# Patient Record
Sex: FEMALE | Race: WHITE
[De-identification: names, ages, dates, MRNs, and addresses within clinical notes are randomized per-mention and may not be internally consistent; named-entity substitution may affect disease eponyms.]

---

## 2018-03-06 ENCOUNTER — HOSPITAL ENCOUNTER (EMERGENCY)
Dept: HOSPITAL 62 - ER | Age: 48
Discharge: HOME | End: 2018-03-06
Payer: SELF-PAY

## 2018-03-06 VITALS — SYSTOLIC BLOOD PRESSURE: 131 MMHG | DIASTOLIC BLOOD PRESSURE: 84 MMHG

## 2018-03-06 DIAGNOSIS — M54.40: Primary | ICD-10-CM

## 2018-03-06 DIAGNOSIS — I10: ICD-10-CM

## 2018-03-06 PROCEDURE — 99283 EMERGENCY DEPT VISIT LOW MDM: CPT

## 2018-03-06 PROCEDURE — 72202 X-RAY EXAM SI JOINTS 3/> VWS: CPT

## 2018-03-06 PROCEDURE — 72110 X-RAY EXAM L-2 SPINE 4/>VWS: CPT

## 2018-03-06 NOTE — RADIOLOGY REPORT (SQ)
EXAM DESCRIPTION:  SACROILIAC JOINTS 3 OR MORE



COMPLETED DATE/TIME:  3/6/2018 1:18 pm



REASON FOR STUDY:  back pain



COMPARISON:  None.



NUMBER OF VIEWS:  Three views.



TECHNIQUE:  AP and oblique views of the sacroiliac joints.



LIMITATIONS:  None.



FINDINGS:  MINERALIZATION: Normal.

BONES: No acute fracture or dislocation. No worrisome bone lesions. No significant osteophytes.

JOINTS: The sacroiliac joints are patent.  No unusual widening, sclerosis, or fusion.

SOFT TISSUES: No soft tissue swelling.  No radio-opaque foreign body.

OTHER: No  other significant finding.



IMPRESSION:  NORMAL STUDY OF THE SACROILIAC JOINTS.



TECHNICAL DOCUMENTATION:  JOB ID:  [id number]

 2011 [employer]- All Rights Reserved



Reading location - IP/workstation name: [email or web address]

## 2018-03-06 NOTE — ER DOCUMENT REPORT
ED Neck/Back Problem





- General


Chief Complaint: Back Pain


Stated Complaint: BACK PAIN


Time Seen by Provider: 03/06/18 12:45


Mode of Arrival: Ambulatory


Information source: Patient


Notes: 





Patient is a 48-year-old female who presents to the ER today for low back pain 

radiating down the backs of both legs down to the toes per patient.  She denies 

any numbness or tingling, loss of bladder or bowel function.  Patient has a 

history of chronic back pain.  She denies any injury.


TRAVEL OUTSIDE OF THE [place] IN LAST 30 DAYS: No





- Related Data


Allergies/Adverse Reactions: 


 





No Known Allergies Allergy (Verified 03/06/18 12:01)


 











Past Medical History





- General


Information source: Patient





- Social History


Smoking Status: Unknown if Ever Smoked


Family History: Reviewed & Not Pertinent





- Past Medical History


Cardiac Medical History: Reports: Hx Hypertension


Pulmonary Medical History: Reports: Hx Bronchitis, Hx Pneumonia - 1998


Neurological Medical History: Reports: Hx Migraine


Renal/ Medical History: Denies: Hx Peritoneal Dialysis


Past Surgical History: Reports: Hx Tubal Ligation





- Immunizations


Hx Diphtheria, Pertussis, Tetanus Vaccination: No





Review of Systems





- Review of Systems


Constitutional: No symptoms reported


EENT: No symptoms reported


Cardiovascular: No symptoms reported


Respiratory: No symptoms reported


Gastrointestinal: No symptoms reported


Genitourinary: No symptoms reported


Female Genitourinary: No symptoms reported


Musculoskeletal: See HPI


Skin: No symptoms reported


Hematologic/Lymphatic: No symptoms reported


Neurological/Psychological: No symptoms reported





Physical Exam





- Vital signs


Vitals: 


 











Temp Pulse Resp BP Pulse Ox


 


 98.8 F   98   19   121/70   98 


 


 03/06/18 12:06  03/06/18 12:06  03/06/18 12:06  03/06/18 12:06  03/06/18 12:06














- Notes


Notes: 





PHYSICAL EXAMINATION: 


GENERAL: Obviously uncomfortable, but in no acute distress. 


HEAD: Atraumatic, normocephalic. 


EYES: Pupils equal round and reactive to light, extraocular movements intact, 

sclera anicteric, conjunctiva are normal. 


NECK: Normal range of motion, supple without lymphadenopathy 


LUNGS: CTAB and equal. No wheezes rales or rhonchi. 


HEART: Regular rate and rhythm without murmurs


ABDOMEN: Soft, no tenderness. No guarding, no rebound 


BACK: Lumbar vertebral tenderness, limited ROM secondary to pain, tender to 

bilateral SI joints


GI/: no CVA tenderness


EXTREMITIES: Normal range of motion, no pitting edema. No cyanosis.  


NEUROLOGICAL: Cranial nerves grossly intact. Normal sensory/motor exams.  Good 

and equal strength bilaterally, Kernig and Brudzinski's signs negative, Romberg'

s test normal, normal heel to shin testing 


PSYCH: Normal mood, normal affect. 


SKIN: Warm, Dry, normal turgor, no rashes or lesions noted

















Course





- Re-evaluation


Re-evalutation: 





03/06/18 16:49


Lumbar x-ray, SI joint x-rays negative for any acute pathology.  Patient feels 

better after muscle relaxer.





- Vital Signs


Vital signs: 


 











Temp Pulse Resp BP Pulse Ox


 


 98.2 F   84   18   131/84 H  95 


 


 03/06/18 14:58  03/06/18 14:58  03/06/18 14:58  03/06/18 14:58  03/06/18 14:58














Discharge





- Discharge


Clinical Impression: 


 Back pain with sciatica





Condition: Stable


Disposition: HOME, SELF-CARE


Additional Instructions: 


Return immediately for any new or worsening symptoms.





Follow up with primary care provider, call tomorrow to make followup 

appointment.


Prescriptions: 


Hydrocodone/Acetaminophen [Norco 5-325 mg Tablet] 1 tab PO Q4 #12 tablet


Methocarbamol [Robaxin 500 mg Tablet] 1,000 mg PO BID PRN #40 tablet


 PRN Reason: 


Prednisone 60 mg PO DAILY #15 tablet


Forms:  Return to Work

## 2019-01-06 ENCOUNTER — HOSPITAL ENCOUNTER (EMERGENCY)
Dept: HOSPITAL 62 - ER | Age: 49
LOS: 1 days | Discharge: HOME | End: 2019-01-07
Payer: SELF-PAY

## 2019-01-06 DIAGNOSIS — R45.851: ICD-10-CM

## 2019-01-06 DIAGNOSIS — Z79.899: ICD-10-CM

## 2019-01-06 DIAGNOSIS — F41.9: ICD-10-CM

## 2019-01-06 DIAGNOSIS — F43.21: Primary | ICD-10-CM

## 2019-01-06 DIAGNOSIS — Z63.4: ICD-10-CM

## 2019-01-06 DIAGNOSIS — F31.9: ICD-10-CM

## 2019-01-06 DIAGNOSIS — I10: ICD-10-CM

## 2019-01-06 DIAGNOSIS — N39.0: ICD-10-CM

## 2019-01-06 DIAGNOSIS — F17.200: ICD-10-CM

## 2019-01-06 LAB
ADD MANUAL DIFF: NO
ALBUMIN SERPL-MCNC: 4.4 G/DL (ref 3.5–5)
ALP SERPL-CCNC: 105 U/L (ref 38–126)
ALT SERPL-CCNC: 23 U/L (ref 9–52)
ANION GAP SERPL CALC-SCNC: 10 MMOL/L (ref 5–19)
APAP SERPL-MCNC: < 10 UG/ML (ref 10–30)
APPEARANCE UR: (no result)
APTT PPP: YELLOW S
AST SERPL-CCNC: 23 U/L (ref 14–36)
BARBITURATES UR QL SCN: NEGATIVE
BASOPHILS # BLD AUTO: 0.1 10^3/UL (ref 0–0.2)
BASOPHILS NFR BLD AUTO: 0.9 % (ref 0–2)
BILIRUB DIRECT SERPL-MCNC: 0.2 MG/DL (ref 0–0.4)
BILIRUB SERPL-MCNC: 0.6 MG/DL (ref 0.2–1.3)
BILIRUB UR QL STRIP: NEGATIVE
BUN SERPL-MCNC: 12 MG/DL (ref 7–20)
CALCIUM: 9.9 MG/DL (ref 8.4–10.2)
CHLORIDE SERPL-SCNC: 107 MMOL/L (ref 98–107)
CO2 SERPL-SCNC: 22 MMOL/L (ref 22–30)
EOSINOPHIL # BLD AUTO: 0.1 10^3/UL (ref 0–0.6)
EOSINOPHIL NFR BLD AUTO: 0.7 % (ref 0–6)
ERYTHROCYTE [DISTWIDTH] IN BLOOD BY AUTOMATED COUNT: 14.4 % (ref 11.5–14)
ETHANOL SERPL-MCNC: < 10 MG/DL
GLUCOSE SERPL-MCNC: 104 MG/DL (ref 75–110)
GLUCOSE UR STRIP-MCNC: NEGATIVE MG/DL
HCT VFR BLD CALC: 48.7 % (ref 36–47)
HGB BLD-MCNC: 16.9 G/DL (ref 12–15.5)
KETONES UR STRIP-MCNC: 20 MG/DL
LYMPHOCYTES # BLD AUTO: 1.8 10^3/UL (ref 0.5–4.7)
LYMPHOCYTES NFR BLD AUTO: 22.2 % (ref 13–45)
MCH RBC QN AUTO: 31 PG (ref 27–33.4)
MCHC RBC AUTO-ENTMCNC: 34.7 G/DL (ref 32–36)
MCV RBC AUTO: 89 FL (ref 80–97)
METHADONE UR QL SCN: NEGATIVE
MONOCYTES # BLD AUTO: 0.4 10^3/UL (ref 0.1–1.4)
MONOCYTES NFR BLD AUTO: 5.1 % (ref 3–13)
NEUTROPHILS # BLD AUTO: 5.9 10^3/UL (ref 1.7–8.2)
NEUTS SEG NFR BLD AUTO: 71.1 % (ref 42–78)
NITRITE UR QL STRIP: NEGATIVE
PCP UR QL SCN: NEGATIVE
PH UR STRIP: 5 [PH] (ref 5–9)
PLATELET # BLD: 348 10^3/UL (ref 150–450)
POTASSIUM SERPL-SCNC: 4.2 MMOL/L (ref 3.6–5)
PROT SERPL-MCNC: 7.5 G/DL (ref 6.3–8.2)
PROT UR STRIP-MCNC: NEGATIVE MG/DL
RBC # BLD AUTO: 5.46 10^6/UL (ref 3.72–5.28)
SALICYLATES SERPL-MCNC: < 1 MG/DL (ref 2–20)
SODIUM SERPL-SCNC: 139.2 MMOL/L (ref 137–145)
SP GR UR STRIP: 1.02
TOTAL CELLS COUNTED % (AUTO): 100 %
URINE AMPHETAMINES SCREEN: NEGATIVE
URINE BENZODIAZEPINES SCREEN: NEGATIVE
URINE COCAINE SCREEN: NEGATIVE
URINE MARIJUANA (THC) SCREEN: (no result)
UROBILINOGEN UR-MCNC: NEGATIVE MG/DL (ref ?–2)
WBC # BLD AUTO: 8.3 10^3/UL (ref 4–10.5)

## 2019-01-06 PROCEDURE — 99285 EMERGENCY DEPT VISIT HI MDM: CPT

## 2019-01-06 PROCEDURE — 84703 CHORIONIC GONADOTROPIN ASSAY: CPT

## 2019-01-06 PROCEDURE — 80307 DRUG TEST PRSMV CHEM ANLYZR: CPT

## 2019-01-06 PROCEDURE — 85025 COMPLETE CBC W/AUTO DIFF WBC: CPT

## 2019-01-06 PROCEDURE — 81001 URINALYSIS AUTO W/SCOPE: CPT

## 2019-01-06 PROCEDURE — 93010 ELECTROCARDIOGRAM REPORT: CPT

## 2019-01-06 PROCEDURE — 93005 ELECTROCARDIOGRAM TRACING: CPT

## 2019-01-06 PROCEDURE — 80053 COMPREHEN METABOLIC PANEL: CPT

## 2019-01-06 PROCEDURE — 36415 COLL VENOUS BLD VENIPUNCTURE: CPT

## 2019-01-06 PROCEDURE — 96372 THER/PROPH/DIAG INJ SC/IM: CPT

## 2019-01-06 RX ADMIN — BUSPIRONE HYDROCHLORIDE SCH MG: 10 TABLET ORAL at 17:56

## 2019-01-06 RX ADMIN — BENZTROPINE MESYLATE SCH MG: 1 TABLET ORAL at 17:56

## 2019-01-06 SDOH — SOCIAL STABILITY - SOCIAL INSECURITY: DISSAPEARANCE AND DEATH OF FAMILY MEMBER: Z63.4

## 2019-01-06 NOTE — ER DOCUMENT REPORT
ED Medical Screen (RME)





- General


Chief Complaint: Psych Problem


Stated Complaint: SI


Time Seen by Provider: 01/06/19 16:06


TRAVEL OUTSIDE OF THE [place] IN LAST 30 DAYS: No





- Related Data


Allergies/Adverse Reactions: 


                                        





No Known Allergies Allergy (Verified 03/06/18 12:01)


   











Past Medical History





- Past Medical History


Cardiac Medical History: Reports: Hx Hypertension


Pulmonary Medical History: Reports: Hx Bronchitis, Hx Pneumonia - 1998


Neurological Medical History: Reports: Hx Migraine


Renal/ Medical History: Denies: Hx Peritoneal Dialysis


Past Surgical History: Reports: Hx Tubal Ligation





- Immunizations


Hx Diphtheria, Pertussis, Tetanus Vaccination: No





Course





- Re-evaluation


Re-evalutation: 





01/06/19 16:11


Suicidal 48-year-old woman who has a past medical history significant for 

bipolar disorder.

## 2019-01-06 NOTE — ER DOCUMENT REPORT
Addendum entered and electronically signed by NORA WALLS LPCA  19 

11:57: 








Discharge





- Discharge


Clinical Impression: 


Urinary tract infection


Qualifiers:


 Hematuria presence: without hematuria 





Condition: Stable


Disposition: HOME, SELF-CARE


Additional Instructions: 


You have been evaluated and assessed at [facility] by both the 

medical and behavioral health teams after presenting for grief and suicidal 

ideation and deemed appropriate for discharge.  While in the ED, you received an

initial medical screening, lab work, EKG, medications, direct staff observation,

clinical evaluation, physician assessments, and outpatient resources.  You are 

encouraged to follow up with your outpatient provider at [facility] [[email or web address]] on 19 for 

medication management and secure an appointment with your therapist as soon as 

possible.  You are also encouraged to maintain compliance with your prescribed m

edication and utilize mobile crisis services as needed.  DEPRESSION:


     Your evaluation reveals that you have mental depression. While symptoms may

be vague, they often include disturbance of sleep, fatigue, loss of appetite, 

and general loss of interest in life.  While depression may be a side effect of 

drugs, or a reaction to a major change in your life, many cases have no known 

cause.


     If depression is acute, and related to a major loss in your life, you can 

expect it to clear completely with time.  If you have been depressed a long 

time, are prone to repeated bouts of depression or low mood, or have been 

thinking of suicide, get help.


     Depression can be treated with anti-depressant medication and counselling. 

Long-term depression will often take a few weeks to clear, even with appropriate

medication.  Follow-up care is important.








SUICIDAL IDEATION:


     Suicidal ideation is a common medical term for thoughts about suicide, 

which may be as detailed as a formulated plan, without the suicidal act itself. 

Although most people who undergo suicidal ideation do not commit suicide, some 

go on to make suicide attempts. The range of suicidal ideation varies greatly 

from fleeting to detailed planning, role playing, and unsuccessful attempts.





     While thoughts about suicide are common, most people do not carry out 

serious actions to commit suicide.  Based upon your evaluation and discussion 

with you, we do not believe you are currently at risk to act upon your thoughts 

of suicide.  You have agreed to return to the Emergency Department, at any time,

if you feel inclined to act upon your suicidal thoughts.








FOLLOW-UP CARE:


If you have been referred to a physician for follow-up care, call the 

physicians office for an appointment as you were instructed or within the next 

two days.  If you experience worsening or a significant change in your symptoms,

notify the physician immediately or return to the Emergency Department at any 

time for re-evaluation.


Bipolar Disorder





     Bipolar disorder is also called manic-depressive disorder. Depression 

alternates with brain hyperactivity called irene.  Each phase lasts from several

days to a few weeks.  We don't know exactly what causes bipolar disorder, but 

it's treatable.


     During the "manic phase," you may feel elated and energetic.  You may have 

racing thoughts, rapid speech, increased activity, and grandiose ideas.  During 

this time, you may not realize how poor your judgement is.  Inappropriate 

spending, drug abuse, excessive alcohol use, marriage problems, and 

irresponsible sexual behavior are common during the manic phase.


     During the "depressive phase," you might feel depressed, guilty, worthless,

fatigued, and unable to concentrate.  You might have thoughts of suicide.


     Good treatments are available for bipolar disorder. Lithium is a classic 

drug for bipolar disorder, and is still often useful. If the manic phase is very

mild, an antidepressant alone can be prescribed.  If the manic phase is very 

severe, an antipsychotic medicine (such as Haldol) may be needed. The treatment 

must be matched to your symptoms, so it's important to work closely with your ps

ychiatric care provider.


     Contact your physician, the hospital emergency center, crisis line, or your

counsellor if you are losing control or having self-destructive thoughts.





Original Note:








ED General





- General


Chief Complaint: Psych Problem


Stated Complaint: SI


Time Seen by Provider: 19 16:06


Mode of Arrival: Ambulatory


Notes: 





48-year-old female with a history of anxiety, depression, bipolar disorder 

presents the emergency department with suicidal ID other passed away this 

morning and she has been thinking about hurting herself.  Patient states that 

her mom was her only reason for living.  She states that now that she is gone 

she does not feel like living anymore.  Patient states that she has been 

thinking about overdosing or shooting herself.  Patient states that she did 

attempt to overdose on Benadryl in .  She states that when her sister  

in  she was grieving and went to have [place].  She is currently following

up with Dr. Contreras at [facility] [[email or web address] She's currently taking her medications as 

directed. She's on celexa, zyprexa, buspar, clonidine, lisinopril. She denies h

omicidal ideations, delusions, hallucinations. 


TRAVEL OUTSIDE OF THE [place] IN LAST 30 DAYS: No





- HPI


Onset: This morning


Onset/Duration: Sudden


Quality of pain: No pain


Severity: None


Pain Level: Denies


Associated symptoms: None


Exacerbated by: Denies


Relieved by: Denies


Similar symptoms previously: Yes


Recently seen / treated by doctor: No





- Related Data


Allergies/Adverse Reactions: 


                                        





No Known Allergies Allergy (Verified 18 12:01)


   











Past Medical History





- General


Information source: Patient





- Social History


Smoking Status: Current Every Day Smoker


Frequency of alcohol use: None


Drug Abuse: Marijuana


Family History: Reviewed & Not Pertinent


Patient has suicidal ideation: Yes


Patient has homicidal ideation: No





- Past Medical History


Cardiac Medical History: Reports: Hx Hypertension


Pulmonary Medical History: Reports: Hx Bronchitis, Hx Pneumonia - 


Neurological Medical History: Reports: Hx Migraine


Renal/ Medical History: Denies: Hx Peritoneal Dialysis


Psychiatric Medical History: Reports: Hx Bipolar Disorder, Hx Depression


Past Surgical History: Reports: Hx Tubal Ligation





- Immunizations


Hx Diphtheria, Pertussis, Tetanus Vaccination: No





Review of Systems





- Review of Systems


Constitutional: No symptoms reported


EENT: No symptoms reported


Cardiovascular: No symptoms reported


Respiratory: No symptoms reported


Gastrointestinal: No symptoms reported


Genitourinary: No symptoms reported


Female Genitourinary: No symptoms reported


Musculoskeletal: No symptoms reported


Skin: No symptoms reported


Hematologic/Lymphatic: No symptoms reported


Neurological/Psychological: Suicidal ideation





Physical Exam





- Notes


Notes: 





PHYSICAL EXAMINATION:





GENERAL: Well-appearing, well-nourished and in no acute distress.





HEAD: Atraumatic, normocephalic.





EYES: Pupils equal round and reactive to light, extraocular movements intact, 

conjunctiva are normal.





ENT: Nares patent, oropharynx clear without exudates.  Moist mucous membranes.





NECK: Normal range of motion, supple without lymphadenopathy





LUNGS: Breath sounds clear to auscultation bilaterally and equal.  No wheezes 

rales or rhonchi.





HEART: Regular rate and rhythm without murmurs





ABDOMEN: Soft, nontender, nondistended abdomen.  No guarding, no rebound.  No 

masses appreciated.





Female : deferred





Musculoskeletal: Normal range of motion, no pitting or edema.  No cyanosis.





NEUROLOGICAL: Cranial nerves grossly intact.  Normal speech, normal gait.  

Normal sensory, motor exams





PSYCH: Sad mood. Suicidal. 





SKIN: Warm, Dry, normal turgor, no rashes or lesions noted.





Course





- Re-evaluation


Re-evalutation: 





19 18:25


EKG: Ventricular rate 79, WI interval 116, cures duration 84, QTc 418, normal 

sinus rhythm.  No ST segment elevation or depression.





Behavioral health recommend giving the patient 10 mg of Zyprexa to help her 

sleep. Tomorrow start zyprexa 5mg BID.  They also recommend 50 mg of Thorazine 

every 6 hours as needed for anxiety/agitation, Cogentin 1 mg daily, BuSpar 10 mg

twice daily.  Petition has been filled out.  Behavioral health will reevaluate 

the patient tomorrow.


19 18:28





Urinalysis shows signs of infection. Patient given 1G of rocephin IM. 


19 18:29








- Laboratory


Result Diagrams: 


                                 19 16:20





                                 19 16:20


Laboratory results interpreted by me: 


                                        











  19





  16:19 16:20 16:20


 


RBC   5.46 H 


 


Hgb   16.9 H 


 


Hct   48.7 H 


 


RDW   14.4 H 


 


Urine Ketones  20 H  


 


Urine Blood  SMALL H  


 


Ur Leukocyte Esterase  LARGE H  


 


Salicylates    < 1.0 L


 


Acetaminophen    < 10 L














Discharge





- Discharge


Clinical Impression: 


 Grieving





Urinary tract infection


Qualifiers:


 Hematuria presence: without hematuria

## 2019-01-06 NOTE — PSYCHOLOGICAL NOTE
Psych Note





- Psych Note


Date seen by psych provider: 01/06/19


Psych Note: 


Reason for Consult: Suicidal ideation





Suicidal 48-year-old woman who has a past medical history significant for 

bipolar disorder





Clinician spoke with mobile crisis worker, Rodrigo Loomis, who reports the patient 

has a diagnosis of bipolar and anxiety disorder.  He reports that the patient's 

mother passed away this morning and she was the caregiver of her mother.  She 

reports that "mom was the only reason to live."  Patient also presents with 

complicated bereavement with her sister's death in 2007. Patient does not 

currently have a plan for her suicidal ideations; however, reports " I would not

use a knife but I would overdose or shot myself if I could."  Patient has an 

outpatient mental health provider through [facility] for medication 

management and therapeutic services.  She did previous suicide attempt in 1997 

which resulted in inpatient psychiatric treatment. 





Medication recommendations per [facility]'s contracted psychiatrist Dr Steve CHAUHAN are

as follows


Zyprexa 10mg once


Cogentin 1mg daily


Thorazine 50mg every 6 hours as needed


Buspar 10mg twice daily





Diagnosis


Bereavement


Bipolar disorder





Impression/plan: patent is recommended for IVC petition for overnight mental 

health observation.

## 2019-01-07 VITALS — DIASTOLIC BLOOD PRESSURE: 103 MMHG | SYSTOLIC BLOOD PRESSURE: 123 MMHG

## 2019-01-07 RX ADMIN — BENZTROPINE MESYLATE SCH MG: 1 TABLET ORAL at 09:24

## 2019-01-07 RX ADMIN — BUSPIRONE HYDROCHLORIDE SCH MG: 10 TABLET ORAL at 09:24

## 2019-01-07 NOTE — PSYCHOLOGICAL NOTE
Psych Note





- Psych Note


Date seen by psych provider: 01/07/19


Time seen by psych provider: 10:00


Psych Note: 


Reason for Consult: Suicidal ideation


Contact Permissions: Roya [phone] and Melinda [phone]





Patient is a 49 yo female presenting to the ED with IFS MCS for SI related to 

her mother's death yesterday.  Patient is understandably tearful this morning 

disclosing that she was her mother's caregiver due to early onset dementia and 

her mother was her emotional and financial support as she is in process of 

filing for disability.  Evaluator explained the grieving process, normalizing 

patient's expressed concerns e.g. thinking about past losses, feeling surreal 

today/overwhelmed yesterday and validated her.  Patient shared about her 

relationship with her sons and grandchildren who live outside the state, shared 

about her divorce in 2018, and sister's death due to OD in 2007.  Patient 

reports being stable on medication for bipolar disorder for many years however, 

had a suicide attempt by OD on Benadryl in 1997 and inpatient psychiatric 

hospitalization.  She endorses passive SI as thoughts of wanting to be with her 

mother, father, and sister but denies intent to act on them.  She identifies 

therapy "really helps", pets "make me feel better", and friends as "very 

helpful/supportive/will make sure I'm okay".  Patient identifies her children 

and grandchildren as her purpose for continuing to live.  





Roya at bedside reassures patient that she will stay with herself and Melinda 

until she feels stable/stronger, will provide additional monitoring supervision 

of medications, will provide transportation to medication appointment at [facility] 

and assist patient in securing an earlier therapy appointment.  





Diagnosis:


Bereavement


Bipolar disorder, per hx





Medication recommendations asa per psychiatric provider, Dr. Wilson are as 

follows:


Zyprexa 10mg once


Cogentin 1mg daily


Thorazine 50mg every 6 hours as needed


Buspar 10mg twice daily





Impression/plan: 


Patient is psychiatrically clear from acute psychiatric services and recommended

rescind IVC due to patient is no longer at risk of harm to self or others aeb 

patient denies SI, reports her children and pets as reason to live, identifies 

supports in her life, and is future-focused with concern of her pets well-being 

in her absence and sons arrival from out of town.  Plan is for patient to 

discharge to her identified supports and stay in their residence until feeling 

up to returning to her home.  Patient and her friend verbalize intent to present

to outpatient provider [facility] for medication appointment on 1/8/19 

and seek earliest available therapy appointment, and utilize Mobile crisis 

services as needed.  Supports will provide additional monitoring and medication 

supervision/administration.  Consulted Dr. Lopez in the care and treatment of 

this patient and ED physician who is in agreement with recommendation and 

disposition.

## 2019-01-07 NOTE — ER DOCUMENT REPORT
Doctor's Note


Notes: 





01/07/19 19:10


This 48-year-old female was seen and evaluated by her mental health team after 

having some thoughts of self-harm because of her mother dying having little 

support system around.


She was deemed appropriate for outpatient management currently does have a 

support system at home and has good follow-up scheduled.


She is comfortable with this plan at this time denies any suicidal desire to 

believe that she is safe.








Discharge





- Discharge


Clinical Impression: 


Urinary tract infection


Qualifiers:


 Urinary tract infection type: acute cystitis Hematuria presence: without 

hematuria Qualified Code(s): N30.00 - Acute cystitis without hematuria





Condition: Stable


Disposition: HOME, SELF-CARE


Instructions:  Urinary Tract Infection ([facility])


Additional Instructions: 


You have been evaluated and assessed at [facility] Emergency Department by both the 

medical and behavioral health teams after presenting for grief and suicidal 

ideation and deemed appropriate for discharge.  While in the ED, you received an

initial medical screening, lab work, EKG, medications, direct staff observation,

clinical evaluation, physician assessments, and outpatient resources.  You are 

encouraged to follow up with your outpatient provider at [facility] on 1/8/19 for 

medication management and secure an appointment with your therapist as soon as 

possible.  You are also encouraged to maintain compliance with your prescribed 

medication and utilize mobile crisis services as needed.  DEPRESSION:


     Your evaluation reveals that you have mental depression. While symptoms may

be vague, they often include disturbance of sleep, fatigue, loss of appetite, 

and general loss of interest in life.  While depression may be a side effect of 

drugs, or a reaction to a major change in your life, many cases have no known 

cause.


     If depression is acute, and related to a major loss in your life, you can 

expect it to clear completely with time.  If you have been depressed a long 

time, are prone to repeated bouts of depression or low mood, or have been 

thinking of suicide, get help.


     Depression can be treated with anti-depressant medication and counselling. 

Long-term depression will often take a few weeks to clear, even with appropriate

medication.  Follow-up care is important.








SUICIDAL IDEATION:


     Suicidal ideation is a common medical term for thoughts about suicide, 

which may be as detailed as a formulated plan, without the suicidal act itself. 

Although most people who undergo suicidal ideation do not commit suicide, some 

go on to make suicide attempts. The range of suicidal ideation varies greatly 

from fleeting to detailed planning, role playing, and unsuccessful attempts.





     While thoughts about suicide are common, most people do not carry out 

serious actions to commit suicide.  Based upon your evaluation and discussion 

with you, we do not believe you are currently at risk to act upon your thoughts 

of suicide.  You have agreed to return to the Emergency Department, at any time,

if you feel inclined to act upon your suicidal thoughts.








FOLLOW-UP CARE:


If you have been referred to a physician for follow-up care, call the 

physicians office for an appointment as you were instructed or within the next 

two days.  If you experience worsening or a significant change in your symptoms,

notify the physician immediately or return to the Emergency Department at any 

time for re-evaluation.


Bipolar Disorder





     Bipolar disorder is also called manic-depressive disorder. Depression 

alternates with brain hyperactivity called irene.  Each phase lasts from several

days to a few weeks.  We don't know exactly what causes bipolar disorder, but 

it's treatable.


     During the "manic phase," you may feel elated and energetic.  You may have 

racing thoughts, rapid speech, increased activity, and grandiose ideas.  During 

this time, you may not realize how poor your judgement is.  Inappropriate 

spending, drug abuse, excessive alcohol use, marriage problems, and 

irresponsible sexual behavior are common during the manic phase.


     During the "depressive phase," you might feel depressed, guilty, worthless,

fatigued, and unable to concentrate.  You might have thoughts of suicide.


     Good treatments are available for bipolar disorder. Lithium is a classic 

drug for bipolar disorder, and is still often useful. If the manic phase is very

mild, an antidepressant alone can be prescribed.  If the manic phase is very 

severe, an antipsychotic medicine (such as Haldol) may be needed. The treatment 

must be matched to your symptoms, so it's important to work closely with your 

psychiatric care provider.


     Contact your physician, the hospital emergency center, crisis line, or your

counsellor if you are losing control or having self-destructive thoughts.


Prescriptions: 


Nitrofurantoin Macrocrystal [Nitrofurantoin] 1,000 mg PO BID #20 capsule

## 2019-03-14 ENCOUNTER — HOSPITAL ENCOUNTER (EMERGENCY)
Dept: HOSPITAL 62 - ER | Age: 49
Discharge: HOME | End: 2019-03-14
Payer: SELF-PAY

## 2019-03-14 DIAGNOSIS — Z98.51: ICD-10-CM

## 2019-03-14 DIAGNOSIS — G51.0: Primary | ICD-10-CM

## 2019-03-14 DIAGNOSIS — H53.8: ICD-10-CM

## 2019-03-14 DIAGNOSIS — F17.200: ICD-10-CM

## 2019-03-14 DIAGNOSIS — R20.0: ICD-10-CM

## 2019-03-14 DIAGNOSIS — I10: ICD-10-CM

## 2019-03-14 LAB
ADD MANUAL DIFF: NO
ALBUMIN SERPL-MCNC: 4.4 G/DL (ref 3.5–5)
ALP SERPL-CCNC: 94 U/L (ref 38–126)
ALT SERPL-CCNC: 17 U/L (ref 9–52)
ANION GAP SERPL CALC-SCNC: 8 MMOL/L (ref 5–19)
AST SERPL-CCNC: 16 U/L (ref 14–36)
BASOPHILS # BLD AUTO: 0.1 10^3/UL (ref 0–0.2)
BASOPHILS NFR BLD AUTO: 1.2 % (ref 0–2)
BILIRUB DIRECT SERPL-MCNC: 0.3 MG/DL (ref 0–0.4)
BILIRUB SERPL-MCNC: 0.6 MG/DL (ref 0.2–1.3)
BUN SERPL-MCNC: 13 MG/DL (ref 7–20)
CALCIUM: 9.8 MG/DL (ref 8.4–10.2)
CHLORIDE SERPL-SCNC: 105 MMOL/L (ref 98–107)
CO2 SERPL-SCNC: 27 MMOL/L (ref 22–30)
EOSINOPHIL # BLD AUTO: 0.1 10^3/UL (ref 0–0.6)
EOSINOPHIL NFR BLD AUTO: 1.4 % (ref 0–6)
ERYTHROCYTE [DISTWIDTH] IN BLOOD BY AUTOMATED COUNT: 14.7 % (ref 11.5–14)
GLUCOSE SERPL-MCNC: 91 MG/DL (ref 75–110)
HCT VFR BLD CALC: 46.2 % (ref 36–47)
HGB BLD-MCNC: 16 G/DL (ref 12–15.5)
INR PPP: 0.85
LYMPHOCYTES # BLD AUTO: 2.5 10^3/UL (ref 0.5–4.7)
LYMPHOCYTES NFR BLD AUTO: 31 % (ref 13–45)
MCH RBC QN AUTO: 31.4 PG (ref 27–33.4)
MCHC RBC AUTO-ENTMCNC: 34.6 G/DL (ref 32–36)
MCV RBC AUTO: 91 FL (ref 80–97)
MONOCYTES # BLD AUTO: 0.6 10^3/UL (ref 0.1–1.4)
MONOCYTES NFR BLD AUTO: 7.1 % (ref 3–13)
NEUTROPHILS # BLD AUTO: 4.8 10^3/UL (ref 1.7–8.2)
NEUTS SEG NFR BLD AUTO: 59.3 % (ref 42–78)
PLATELET # BLD: 263 10^3/UL (ref 150–450)
POTASSIUM SERPL-SCNC: 4 MMOL/L (ref 3.6–5)
PROT SERPL-MCNC: 7.1 G/DL (ref 6.3–8.2)
PROTHROMBIN TIME: 12 SEC (ref 11.4–15.4)
RBC # BLD AUTO: 5.1 10^6/UL (ref 3.72–5.28)
SODIUM SERPL-SCNC: 140 MMOL/L (ref 137–145)
TOTAL CELLS COUNTED % (AUTO): 100 %
WBC # BLD AUTO: 8 10^3/UL (ref 4–10.5)

## 2019-03-14 PROCEDURE — 85025 COMPLETE CBC W/AUTO DIFF WBC: CPT

## 2019-03-14 PROCEDURE — 99284 EMERGENCY DEPT VISIT MOD MDM: CPT

## 2019-03-14 PROCEDURE — 93005 ELECTROCARDIOGRAM TRACING: CPT

## 2019-03-14 PROCEDURE — 70450 CT HEAD/BRAIN W/O DYE: CPT

## 2019-03-14 PROCEDURE — 85610 PROTHROMBIN TIME: CPT

## 2019-03-14 PROCEDURE — 80053 COMPREHEN METABOLIC PANEL: CPT

## 2019-03-14 PROCEDURE — 93010 ELECTROCARDIOGRAM REPORT: CPT

## 2019-03-14 PROCEDURE — 36415 COLL VENOUS BLD VENIPUNCTURE: CPT

## 2019-03-14 NOTE — ER DOCUMENT REPORT
ED Medical Screen (RME)





- General


Chief Complaint: S/S of Possible Stroke


Stated Complaint: NUMBNESS IN FACE


Time Seen by Provider: 03/14/19 13:49


Mode of Arrival: Ambulatory


Information source: Patient


TRAVEL OUTSIDE OF THE [place] IN LAST 30 DAYS: No





- HPI


Notes: 





03/14/19 13:50


49-year-old female presents the ED with complaints of new onset left-sided fa

cial droop with right-sided facial numbness that she noticed that last night 

when she was talking with her girlfriend.  Denies any numbness or tingling 

bilateral arms or legs.  Denies any previous history of stroke, MI, blood 

disorders.  Patient was concerned today because she is still having left-sided 

facial droop with right numbness in her face.  Denies any new medications, 

travel or foods.  Denies history of Bell's palsy.  Patient denies any trauma.  

Denies headache, chest pain, shortness of breath, blurred vision double vision 

loss of vision, ataxia, confusion.  Patient did drive herself to the emergency 

room.





I have greeted and performed a rapid initial assessment of this patient.  A 

comprehensive ED assessment and evaluation of the patient, analysis of test 

results and completion of medical decision making process will be conducted by 

an additional ED providers.








03/14/19 14:09








- Related Data


Allergies/Adverse Reactions: 


                                        





No Known Allergies Allergy (Verified 03/06/18 12:01)


   











Past Medical History





- Past Medical History


Cardiac Medical History: Reports: Hx Hypertension


Pulmonary Medical History: Reports: Hx Bronchitis, Hx Pneumonia - 1998


Neurological Medical History: Reports: Hx Migraine


Renal/ Medical History: Denies: Hx Peritoneal Dialysis


Psychiatric Medical History: Reports: Hx Bipolar Disorder, Hx Depression


Past Surgical History: Reports: Hx Tubal Ligation





- Immunizations


Hx Diphtheria, Pertussis, Tetanus Vaccination: No





Physical Exam





- Vital signs


Vitals: 


                                        











Pulse Resp BP Pulse Ox


 


 88   14   139/84 H  96 


 


 03/14/19 13:44  03/14/19 13:44  03/14/19 13:44  03/14/19 13:44














- Neurological


Neuro grossly intact: Yes


Orientation: AAOx4


[place] Coma Scale Verbal: Oriented


[place] Coma Scale Motor: Obeys Commands


Speech: Normal


Cerebellar coordination: Normal


Motor strength normal: LUE, RUE, LLE, RLE


Additional motor exam normals: Equal 


Notes: 





Left-sided facial droop.  Decreased sensation to right side of face.  Able to 

raise eyebrows bilaterally.  Difficulty with puffing out her cheeks.   +2 

equally and bilaterally.  Strength 5 out of 5 in bilateral upper and lower 

extremities, no pronator drift or dysmetria noted





Course





- Vital Signs


Vital signs: 


                                        











Temp Pulse Resp BP Pulse Ox


 


    87   20   138/84 H  95 


 


    03/14/19 13:44  03/14/19 13:44  03/14/19 13:44  03/14/19 13:44

## 2019-03-14 NOTE — ER DOCUMENT REPORT
Entered by LISSA COHEN SCRIBE  03/14/19 [id number] 





Acting as scribe for:HALEY HURTADO DO





ED General





- General


Chief Complaint: S/S of Possible Stroke


Stated Complaint: NUMBNESS IN FACE


Time Seen by Provider: 03/14/19 13:49


Mode of Arrival: Ambulatory


Information source: Patient


Notes: 


Patient is a 49 year old female presenting to the emergency department 

complaining of numbness of the face, bilateral hands and feet. Patient states 

for the last few days she has had numbness of the right side of the face and 

blurry vision of the right eye. She states her friends and family also noticed 

drooping on the left side of her face, around her lip. She states she is concer

bianca of a stroke due to her mother having a history of strokes. 





TRAVEL OUTSIDE OF THE [place] IN LAST 30 DAYS: No





- Related Data


Allergies/Adverse Reactions: 


                                        





No Known Allergies Allergy (Verified 03/06/18 12:01)


   











Past Medical History





- General


Information source: Patient





- Social History


Smoking Status: Current Some Day Smoker


Chew tobacco use (# tins/day): No


Frequency of alcohol use: None


Drug Abuse: Marijuana


Family History: Reviewed & Not Pertinent


Patient has suicidal ideation: No


Patient has homicidal ideation: No





- Past Medical History


Cardiac Medical History: Reports: Hx Hypertension


Pulmonary Medical History: Reports: Hx Bronchitis, Hx Pneumonia - 1998


Neurological Medical History: Reports: Hx Migraine


Psychiatric Medical History: Reports: Hx Bipolar Disorder, Hx Depression


Past Surgical History: Reports: Hx Tubal Ligation





- Immunizations


Hx Diphtheria, Pertussis, Tetanus Vaccination: No





Review of Systems





- Review of Systems


Constitutional: No symptoms reported


EENT: No symptoms reported


Cardiovascular: No symptoms reported


Respiratory: No symptoms reported


Gastrointestinal: No symptoms reported


Genitourinary: No symptoms reported


Female Genitourinary: No symptoms reported


Musculoskeletal: See HPI


Skin: No symptoms reported


Hematologic/Lymphatic: No symptoms reported


Neurological/Psychological: See HPI





Physical Exam





- Vital signs


Vitals: 


                                        











Pulse Resp BP Pulse Ox


 


 88   14   139/84 H  96 


 


 03/14/19 13:44  03/14/19 13:44  03/14/19 13:44  03/14/19 13:44














- Notes


Notes: 


 


GENERAL: Alert, interacts well. No acute distress.


HEAD: Normocephalic, atraumatic.


EYES: Pupils equal, round, and reactive to light. Extraocular movements intact. 

Right eye does not fully close with casual blinking, will fully close with 

determined blinking. Decreased sensation to the right side of the face.  


ENT: Oral mucosa moist, tongue midline.  Tympanic membranes intact, no blisters,

no Franco sign.


NECK: Full range of motion. Supple. Trachea midline.


LUNGS: Clear to auscultation bilaterally, no wheezes, rales, or rhonchi. No 

respiratory distress.


HEART: Regular rate and rhythm. No murmurs, gallops, or rubs.


ABDOMEN: Soft, non-tender,No guarding, rigidity, or rebound. . Non-distended. 

Bowel sounds present in all 4 quadrants. 


EXTREMITIES: Moves all 4 extremities spontaneously. 


NEUROLOGICAL: Alert and oriented x3. Normal speech.finger to nose testing 

intact. 5/5 muscle strength.


PSYCH: Normal affect, normal mood.


SKIN: Warm, dry, normal turgor. No rashes or lesions noted.








- HEENT


Eyes: Normal


Conjunctiva: Normal


Cornea: Normal.  No: Corneal abrasion, Flourescein stain uptake


Extraocular movements intact: Yes


Eyelashes: Normal


Pupils: PERRL


Nerve palsy: No


Visual fields normal: Yes





Course





- Re-evaluation


Re-evalutation: 





03/14/19 18:04


CBC unremarkable, coags normal, CMP unremarkable, CT scan of the head 

unremarkable.  Patient's presentation is actually more consistent with Bell's 

palsy than with stroke.  Patient has no peripheral deficits.  Patient did have 

complete workup as she had been having intermittent weakness of her bilateral 

arms and bilateral legs with cramping.  I want to make sure that there were no 

electrolyte abnormalities.  This was all normal.


03/14/19 18:05


Patient will be started on steroids and antivirals.  Discharged home.





- Vital Signs


Vital signs: 


                                        











Temp Pulse Resp BP Pulse Ox


 


    87   22 H  135/85 H  99 


 


    03/14/19 13:44  03/14/19 18:00  03/14/19 18:23  03/14/19 18:00














- Laboratory


Result Diagrams: 


                                 03/14/19 14:50





                                 03/14/19 15:30


Laboratory results interpreted by me: 


                                        











  03/14/19





  14:50


 


Hgb  16.0 H


 


RDW  14.7 H














Discharge





- Discharge


Clinical Impression: 


 Bell's palsy





Condition: Stable


Disposition: HOME, SELF-CARE


Instructions:  Bell's Palsy ([facility])


Prescriptions: 


Acyclovir [Acyclovir 400 mg Tablet] 400 mg PO 5XD #50 tablet


Prednisone [Deltasone 20 mg Tablet] 20 mg PO DAILY #5 tablet





I personally performed the services described in the documentation, reviewed and

edited the documentation which was dictated to the scribe in my presence, and it

accurately records my words and actions.

## 2019-03-14 NOTE — RADIOLOGY REPORT (SQ)
EXAM DESCRIPTION:  CT HEAD WITHOUT



COMPLETED DATE/TIME:  3/14/2019 1:57 pm



REASON FOR STUDY:  stroke symptoms



COMPARISON:  1/10/2013



TECHNIQUE:  Axial images acquired through the brain without intravenous contrast.  Images reviewed wi
th bone, brain and subdural windows.  Additional sagittal and coronal reconstructions were generated.
 Images stored on PACS.

All CT scanners at this facility use dose modulation, iterative reconstruction, and/or weight based d
osing when appropriate to reduce radiation dose to as low as reasonably achievable (ALARA).

[facility]: Dose Right  [facility]: CareDose    [facility]: Dose Right    [facility]: Teradose 4D    [facility]: Smart Technologies



RADIATION DOSE:  CT Rad equipment meets quality standard of care and radiation dose reduction techniq
ues were employed. CTDIvol: 53.2 mGy. DLP: 1044 mGy-cm. mGy.



LIMITATIONS:  None.



FINDINGS:  VENTRICLES: Normal size and contour.

CEREBRUM: No masses.  No hemorrhage.  No midline shift.  No evidence for acute infarction. Normal gra
y/white matter differentiation. No areas of low density in the white matter.

CEREBELLUM: No masses.  No hemorrhage.  No alteration of density.  No evidence for acute infarction.

EXTRAAXIAL SPACES: No fluid collections.  No masses.

ORBITS AND GLOBE: No intra- or extraconal masses.  Normal contour of globe without masses.

CALVARIUM: No fracture.

PARANASAL SINUSES: No fluid or mucosal thickening.

SOFT TISSUES: No mass or hematoma.

OTHER: No other significant finding.



IMPRESSION:  No acute intracranial pathology.

EVIDENCE OF ACUTE STROKE: NO.



COMMENT:  Quality ID # [id number]: Final reports with documentation of one or more dose reduction techniques
 (e.g., Automated exposure control, adjustment of the mA and/or kV according to patient size, use of 
iterative reconstruction technique)



TECHNICAL DOCUMENTATION:  JOB ID:  [id number]

 2011 [employer]- All Rights Reserved



Reading location - IP/workstation name: [email or web address]

## 2019-03-15 VITALS — SYSTOLIC BLOOD PRESSURE: 141 MMHG | DIASTOLIC BLOOD PRESSURE: 72 MMHG

## 2019-06-12 ENCOUNTER — HOSPITAL ENCOUNTER (INPATIENT)
Dept: HOSPITAL 62 - ER | Age: 49
LOS: 3 days | Discharge: HOME | DRG: 247 | End: 2019-06-15
Attending: FAMILY MEDICINE | Admitting: FAMILY MEDICINE
Payer: SELF-PAY

## 2019-06-12 DIAGNOSIS — Z80.9: ICD-10-CM

## 2019-06-12 DIAGNOSIS — F31.9: ICD-10-CM

## 2019-06-12 DIAGNOSIS — I16.0: ICD-10-CM

## 2019-06-12 DIAGNOSIS — Z82.49: ICD-10-CM

## 2019-06-12 DIAGNOSIS — Z83.3: ICD-10-CM

## 2019-06-12 DIAGNOSIS — Z79.82: ICD-10-CM

## 2019-06-12 DIAGNOSIS — Z79.899: ICD-10-CM

## 2019-06-12 DIAGNOSIS — I21.4: Primary | ICD-10-CM

## 2019-06-12 DIAGNOSIS — Z88.8: ICD-10-CM

## 2019-06-12 DIAGNOSIS — F17.210: ICD-10-CM

## 2019-06-12 DIAGNOSIS — E66.9: ICD-10-CM

## 2019-06-12 DIAGNOSIS — Z83.6: ICD-10-CM

## 2019-06-12 DIAGNOSIS — F41.1: ICD-10-CM

## 2019-06-12 DIAGNOSIS — Z79.02: ICD-10-CM

## 2019-06-12 DIAGNOSIS — I10: ICD-10-CM

## 2019-06-12 LAB
ADD MANUAL DIFF: NO
ALBUMIN SERPL-MCNC: 4 G/DL (ref 3.5–5)
ALP SERPL-CCNC: 80 U/L (ref 38–126)
ALT SERPL-CCNC: 15 U/L (ref 9–52)
ANION GAP SERPL CALC-SCNC: 7 MMOL/L (ref 5–19)
AST SERPL-CCNC: 20 U/L (ref 14–36)
BASOPHILS # BLD AUTO: 0.1 10^3/UL (ref 0–0.2)
BASOPHILS NFR BLD AUTO: 0.8 % (ref 0–2)
BILIRUB DIRECT SERPL-MCNC: 0.4 MG/DL (ref 0–0.4)
BILIRUB SERPL-MCNC: 0.5 MG/DL (ref 0.2–1.3)
BUN SERPL-MCNC: 14 MG/DL (ref 7–20)
CALCIUM: 9.6 MG/DL (ref 8.4–10.2)
CHLORIDE SERPL-SCNC: 107 MMOL/L (ref 98–107)
CO2 SERPL-SCNC: 26 MMOL/L (ref 22–30)
EOSINOPHIL # BLD AUTO: 0.2 10^3/UL (ref 0–0.6)
EOSINOPHIL NFR BLD AUTO: 2.3 % (ref 0–6)
ERYTHROCYTE [DISTWIDTH] IN BLOOD BY AUTOMATED COUNT: 14.8 % (ref 11.5–14)
GLUCOSE SERPL-MCNC: 146 MG/DL (ref 75–110)
HCT VFR BLD CALC: 45 % (ref 36–47)
HGB BLD-MCNC: 15.3 G/DL (ref 12–15.5)
LYMPHOCYTES # BLD AUTO: 2.6 10^3/UL (ref 0.5–4.7)
LYMPHOCYTES NFR BLD AUTO: 33.9 % (ref 13–45)
MCH RBC QN AUTO: 30.9 PG (ref 27–33.4)
MCHC RBC AUTO-ENTMCNC: 34 G/DL (ref 32–36)
MCV RBC AUTO: 91 FL (ref 80–97)
MONOCYTES # BLD AUTO: 0.5 10^3/UL (ref 0.1–1.4)
MONOCYTES NFR BLD AUTO: 6.6 % (ref 3–13)
NEUTROPHILS # BLD AUTO: 4.3 10^3/UL (ref 1.7–8.2)
NEUTS SEG NFR BLD AUTO: 56.4 % (ref 42–78)
PLATELET # BLD: 309 10^3/UL (ref 150–450)
POTASSIUM SERPL-SCNC: 4 MMOL/L (ref 3.6–5)
PROT SERPL-MCNC: 7 G/DL (ref 6.3–8.2)
RBC # BLD AUTO: 4.95 10^6/UL (ref 3.72–5.28)
SODIUM SERPL-SCNC: 140.2 MMOL/L (ref 137–145)
TOTAL CELLS COUNTED % (AUTO): 100 %
WBC # BLD AUTO: 7.6 10^3/UL (ref 4–10.5)

## 2019-06-12 PROCEDURE — 82550 ASSAY OF CK (CPK): CPT

## 2019-06-12 PROCEDURE — 83036 HEMOGLOBIN GLYCOSYLATED A1C: CPT

## 2019-06-12 PROCEDURE — 80048 BASIC METABOLIC PNL TOTAL CA: CPT

## 2019-06-12 PROCEDURE — 83735 ASSAY OF MAGNESIUM: CPT

## 2019-06-12 PROCEDURE — 80053 COMPREHEN METABOLIC PANEL: CPT

## 2019-06-12 PROCEDURE — 81001 URINALYSIS AUTO W/SCOPE: CPT

## 2019-06-12 PROCEDURE — 71045 X-RAY EXAM CHEST 1 VIEW: CPT

## 2019-06-12 PROCEDURE — C1874 STENT, COATED/COV W/DEL SYS: HCPCS

## 2019-06-12 PROCEDURE — 93010 ELECTROCARDIOGRAM REPORT: CPT

## 2019-06-12 PROCEDURE — C1769 GUIDE WIRE: HCPCS

## 2019-06-12 PROCEDURE — 84100 ASSAY OF PHOSPHORUS: CPT

## 2019-06-12 PROCEDURE — 92928 PRQ TCAT PLMT NTRAC ST 1 LES: CPT

## 2019-06-12 PROCEDURE — 85027 COMPLETE CBC AUTOMATED: CPT

## 2019-06-12 PROCEDURE — 82553 CREATINE MB FRACTION: CPT

## 2019-06-12 PROCEDURE — 80076 HEPATIC FUNCTION PANEL: CPT

## 2019-06-12 PROCEDURE — 99285 EMERGENCY DEPT VISIT HI MDM: CPT

## 2019-06-12 PROCEDURE — 93005 ELECTROCARDIOGRAM TRACING: CPT

## 2019-06-12 PROCEDURE — 84481 FREE ASSAY (FT-3): CPT

## 2019-06-12 PROCEDURE — 84443 ASSAY THYROID STIM HORMONE: CPT

## 2019-06-12 PROCEDURE — C1725 CATH, TRANSLUMIN NON-LASER: HCPCS

## 2019-06-12 PROCEDURE — C1887 CATHETER, GUIDING: HCPCS

## 2019-06-12 PROCEDURE — 84439 ASSAY OF FREE THYROXINE: CPT

## 2019-06-12 PROCEDURE — 80061 LIPID PANEL: CPT

## 2019-06-12 PROCEDURE — 84484 ASSAY OF TROPONIN QUANT: CPT

## 2019-06-12 PROCEDURE — 36415 COLL VENOUS BLD VENIPUNCTURE: CPT

## 2019-06-12 PROCEDURE — 85025 COMPLETE CBC W/AUTO DIFF WBC: CPT

## 2019-06-12 PROCEDURE — 93458 L HRT ARTERY/VENTRICLE ANGIO: CPT

## 2019-06-12 NOTE — ER DOCUMENT REPORT
ED General





- General


Chief Complaint: Chest Pressure


Stated Complaint: CHEST PRESSURE


Notes: 





Patient is a 49-year-old female presents with complaint of sudden onset of chest

pain at 10 PM that started in the substernal region rating to left shoulder and 

down left arm up into the left neck.  She was diaphoretic.  She did feel short 

of breath.  Her ex- really gave her 4 baby aspirin.  They called the 

ambulance.  When paramedics arrived they gave her nitro which immediately 

relieved her pain.  She has not had any recurrence of pain since.  She does have

a history of hypertension and does take lisinopril and hydrochlorothiazide for 

this.  She has no other medical problems other than anxiety for which she takes 

medications.  No other complaints at this time.


TRAVEL OUTSIDE OF THE [place] IN LAST 30 DAYS: No





- Related Data


Allergies/Adverse Reactions: 


                                        





No Known Allergies Allergy (Verified 03/06/18 12:01)


   











Past Medical History





- Social History


Smoking Status: Current Every Day Smoker


Chew tobacco use (# tins/day): No


Frequency of alcohol use: None


Drug Abuse: None


Family History: Reviewed & Not Pertinent


Patient has suicidal ideation: No


Patient has homicidal ideation: No





- Past Medical History


Cardiac Medical History: Reports: Hx Hypertension


Pulmonary Medical History: Reports: Hx Bronchitis, Hx Pneumonia - 1998


Neurological Medical History: Reports: Hx Migraine


Renal/ Medical History: Denies: Hx Peritoneal Dialysis


Psychiatric Medical History: Reports: Hx Bipolar Disorder, Hx Depression


Past Surgical History: Reports: Hx Tubal Ligation





- Immunizations


Hx Diphtheria, Pertussis, Tetanus Vaccination: No





Review of Systems





- Review of Systems


Notes: 





My Normal Review Basic





REVIEW OF SYSTEMS:


CONSTITUTIONAL :  Denies fever,  chills, or sweats.  Denies recent illness.


EENT:   Denies eye, ear, throat, or mouth pain or symptoms.  Denies nasal or 

sinus congestion.


CARDIOVASCULAR: Had chest pain


RESPIRATORY:  Denies cough, cold, or chest congestion.  Shortness of breath in 

conjunction with chest pain.


GASTROINTESTINAL:  Denies abdominal pain.  Did vomit.


MUSCULOSKELETAL:  Denies neck or back pain or joint pain or swelling.


SKIN:   Denies rash or skin lesions.


NEUROLOGICAL:  Denies altered mental status or loss of consciousness. 


ALL OTHER SYSTEMS REVIEWED AND NEGATIVE.





Physical Exam





- Vital signs


Vitals: 


                                        











Temp


 


 97.8 F 


 


 06/12/19 23:36














- Notes


Notes: 





General Appearance: Well nourished, alert, cooperative, no acute distress, no 

obvious discomfort.


Vitals: reviewed, See vital signs table.


Head: no swelling or tenderness to the head


Eyes: PERRL, EOMI, Conjuctiva clear


Mouth: No decreasd moisture


Lungs: No wheezing, No rales, No rhonci, No accessory muscle use, good air 

exchange bilaterally.


Heart: Normal rate, Regular rythm, No murmur, no rub


Abdomen: Normal BS, soft, No rigidity, No abdominal tenderness, No guarding, no 

rebound, no abdominal masses, no organomegaly


Extremities: strength 5/5 in all extremities, good pulses in all extremities, no

swelling or tenderness in the extremities, no edema.


Skin: warm, dry, appropriate color, no rash


Neuro: speech clear, oriented x 3, normal affect, responds appropriately to 

questions.





Course





- Re-evaluation


Re-evalutation: 





06/12/19 23:55


I informed charge nurse patient still has not had her EKG performed.  Informed 

her that we need to get this done soon as possible.  Patient currently is chest 

pain-free however she has a concerning story and therefore needs to have her EKG

done immediately.  They are getting it done now.


06/13/19 00:35








She is EKG does not show concerning findings.  Her story is very concerning and 

that she had diaphoresis with her chest pain and also nausea and shortness of 

breath.  Pain was relieved with nitro and her pain is not since returned.  I 

have not placed Nitropaste on her as she is currently pain-free and her pressure

is in the low 100 systolically.  Her pain is elevated.  I have given a dose Lov

enox.  She had aspirin at home.  I did speak with her about admission patient is

agreeable to this.  I did speak with her hospitalist, Dr. Weiland, who agrees to

evaluate the patient for admission. 





- Vital Signs


Vital signs: 


                                        











Temp Pulse Resp BP Pulse Ox


 


 97.8 F             


 


 06/12/19 23:36            














- Laboratory


Result Diagrams: 


                                 06/12/19 23:10





                                 06/12/19 23:10


Laboratory results interpreted by me: 


                                        











  06/12/19 06/12/19





  23:10 23:10


 


RDW  14.8 H 


 


Glucose   146 H














- EKG Interpretation by Me


Additional EKG results interpreted by me: 





06/13/19 00:06


EKG is reviewed and interpreted by me.  EKG shows sinus rhythm with a rate of 63

bpm.  No ST segment elevation or depression.  No ischemic T wave inversions.  MT

interval, QRS duration, QT intervals are within normal range.  Old EKG for 

comparison is from March 14, 2019.





Discharge





- Discharge


Clinical Impression: 


 NSTEMI (non-ST elevated myocardial infarction)





Condition: Good


Disposition: ADMITTED AS INPATIENT


Admitting Provider: Weiland (Hospitalist)


Unit Admitted: [facility]

## 2019-06-13 LAB
APPEARANCE UR: (no result)
APTT PPP: YELLOW S
BILIRUB UR QL STRIP: NEGATIVE
CHOLEST SERPL-MCNC: 240.86 MG/DL (ref 0–200)
CK MB SERPL-MCNC: 1.44 NG/ML (ref ?–4.55)
CK MB SERPL-MCNC: 1.92 NG/ML (ref ?–4.55)
CK MB SERPL-MCNC: 2.32 NG/ML (ref ?–4.55)
FREE T4 (FREE THYROXINE): 1.09 NG/DL (ref 0.78–2.19)
GLUCOSE UR STRIP-MCNC: NEGATIVE MG/DL
KETONES UR STRIP-MCNC: (no result) MG/DL
LDLC SERPL DIRECT ASSAY-MCNC: 152 MG/DL (ref ?–100)
NITRITE UR QL STRIP: NEGATIVE
PH UR STRIP: 6 [PH] (ref 5–9)
PROT UR STRIP-MCNC: NEGATIVE MG/DL
SP GR UR STRIP: 1.03
T3FREE SERPL-MCNC: 4.56 PG/ML (ref 2.77–5.27)
TRIGL SERPL-MCNC: 140 MG/DL (ref ?–150)
TROPONIN I SERPL-MCNC: 0.28 NG/ML
TROPONIN I SERPL-MCNC: 0.44 NG/ML
TROPONIN I SERPL-MCNC: 0.54 NG/ML
TSH SERPL-ACNC: 2 UIU/ML (ref 0.47–4.68)
UROBILINOGEN UR-MCNC: NEGATIVE MG/DL (ref ?–2)
VLDLC SERPL CALC-MCNC: 28 MG/DL (ref 10–31)

## 2019-06-13 PROCEDURE — B2150ZZ FLUOROSCOPY OF LEFT HEART USING HIGH OSMOLAR CONTRAST: ICD-10-PCS | Performed by: INTERNAL MEDICINE

## 2019-06-13 PROCEDURE — B2110ZZ FLUOROSCOPY OF MULTIPLE CORONARY ARTERIES USING HIGH OSMOLAR CONTRAST: ICD-10-PCS | Performed by: INTERNAL MEDICINE

## 2019-06-13 PROCEDURE — 027034Z DILATION OF CORONARY ARTERY, ONE ARTERY WITH DRUG-ELUTING INTRALUMINAL DEVICE, PERCUTANEOUS APPROACH: ICD-10-PCS | Performed by: INTERNAL MEDICINE

## 2019-06-13 PROCEDURE — 4A023N7 MEASUREMENT OF CARDIAC SAMPLING AND PRESSURE, LEFT HEART, PERCUTANEOUS APPROACH: ICD-10-PCS | Performed by: INTERNAL MEDICINE

## 2019-06-13 RX ADMIN — ATORVASTATIN CALCIUM SCH MG: 40 TABLET, FILM COATED ORAL at 21:31

## 2019-06-13 RX ADMIN — BUSPIRONE HYDROCHLORIDE SCH MG: 10 TABLET ORAL at 19:49

## 2019-06-13 RX ADMIN — FAMOTIDINE SCH MG: 20 TABLET, FILM COATED ORAL at 21:32

## 2019-06-13 RX ADMIN — NICOTINE SCH EACH: 21 PATCH, EXTENDED RELEASE TOPICAL at 19:50

## 2019-06-13 RX ADMIN — TICAGRELOR SCH MG: 90 TABLET ORAL at 21:31

## 2019-06-13 RX ADMIN — FAMOTIDINE SCH: 20 TABLET, FILM COATED ORAL at 13:30

## 2019-06-13 RX ADMIN — DOCUSATE SODIUM SCH: 100 CAPSULE, LIQUID FILLED ORAL at 13:30

## 2019-06-13 RX ADMIN — Medication SCH: at 21:24

## 2019-06-13 RX ADMIN — Medication SCH: at 05:00

## 2019-06-13 RX ADMIN — DOCUSATE SODIUM SCH: 100 CAPSULE, LIQUID FILLED ORAL at 21:24

## 2019-06-13 RX ADMIN — CITALOPRAM HYDROBROMIDE SCH MG: 20 TABLET ORAL at 19:50

## 2019-06-13 RX ADMIN — Medication SCH: at 19:41

## 2019-06-13 NOTE — OPERATIVE REPORT
Operative Report-Cath lab


Operative Report: 








Indication: Acute Coronary Syndrome





PROCEDURE NOTES: Left heart catheterization, bilateral selective coronary 

angiography, left ventricular CINE angiography with PTCA / Durg Eluting stent to

Proximal / mid RCA.


  


After informed consent was obtained the patient was brought into the cardiac 

catheterization lab.   Sterile prep and drape of the right wrist was carried out

followed by infiltration with 1% Xylocaine.  Using the percutaneous Seldinger 

technique, a 6 [place] of [place] Introducer sheath was placed into the right Radial artery.





Selective left and right coronary angiography was performed with a 5 [place] 

Barbeau pre-formed coronary catheter.  A 5 [place] barbeua catheter was 

subsequently introduced in the artery and advanced to the ascending aorta.   The

aortic valve was crossed and the left ventricle was entered.  Repeated 

measurements of the left heart pressure were made after injection of the 

contrast agent as used for left ventricle CINE-ography. 


 


ANALYSIS OF THE ANGIOGRAM:  Selective CINE angiograms were obtained with the 

injection of contrast material into the left and right coronary ostium and left 

ventricular cavity.





The left ventricular injection demonstrates  no wall motion abnormality.   

Ejection fraction is 55-60 % .  No significant mitral regurgitation is noted.  

No aortic stenosis of significance by pull back technique.


    


CORONARY ANGIOGRAPHY: Coronary angiography performed in multiple projections 

revealed the following;





LEFT MAIN: Moderate caliber vessel that is autobiographically patent. 





LEFT CIRCUMFLEX/OBTUSE MARGINAL:  Moderate caliber vessel and appears 

angiographically patent.





LEFT ANTERIOR DESCENDING/DIAGONAL:  Moderate caliber vessel.  It appears 

angiographically patent. 





RIGHT CORONARY ARTERY:  Moderate caliber vessel with diffuse tandem lesions in 

proximal and middle portion of 85% followed by 65 %.  The distal Right Coronary 

artery has a 40-50% diffuse non-obstructive plaque.





Intervention:





Patient was placed on Angiomax bolus and IV gtt per protocol.  A 6 Fr. JR4 Guide

was utilized to selectively engage the RCA.  A [employer] guidewire was fed easily

into the distal RCA.  Pre-dilation was carried out with a semi-complaint 2.5 x 

15 at 12 юлия for 20 seconds.  This showed continued high grade plaque but no 

waisting with use of balloon and BOOM 3 flow.  It was decided to stent this 

region using a [employer] Eluting stent of a 3.0 x 30 mm that was deployed at

16 юлия for 35 seconds.  This revealed 0% residual stenosis and BOOM 3 flow.  The

distal area appeared unchanged even with sequential administration of 50 mcg of 

IC nitro.  The wire was removed and post PCI angiogram appeared unchanged.    

The guiding catheter was then removed without incident.





The Radial sheath was tthen removed and a TR band was used for adequate 

hemostasis.  Patient tolerated procedure well.





A total of 57 minutes was spent with supervision of administration of moderate 

conscious sedation combined with continuous oxygen monitoring and hemodynamic 

monitoring.





Plan:





ECASA 81 mg po nightly for life as tolerated.





Brilinta 90 mg po bid for 12 months with discount care to be supplied and if any

issues with affordability would transition to generic plavix at 75 mg po daily 

for 12 months, but favor Brilinta over Generic Plavix given presentation as a 

ACS.





Smoking cessation.





Risk Factor modification with statin typically at high dose such as Atorvastatin

at 40 mg po nightly to get LDL < 70.





Cardiac Rehab will be offered.





She may follow-up with [facility] in [place] within 4 weeks 

and if issues with availability could be seen at [facility] as alternative.





Case verbally discussed with hospitalist caring for patient today.

## 2019-06-13 NOTE — PDOC H&P
History of Present Illness


Admission Date/PCP: 


06/13/19 00:52


No local PCP


Patient complains of: Chest pain


History of Present Illness: 


STEFANI PATINO is a 49 year old female who presented to the emergency room with 

acute chest pain.  She admits that at shortly before 10 PM she ate a bowl of ce

real before going to bed and after lying down for a period of 1 or 2 minutes she

began feeling a deep seated pressure-like pain in her mid back that rapidly 

moved forward through her chest as a severely intense pressure in the mid-

sternal anterior chest region, radiating to her left neck and shoulder and down 

her left arm.  Pain was accompanied by nausea, vomiting (x1), dyspnea and 

diaphoresis.  Shortly after the onset of pain she took 4 baby aspirin and called

for emergency medical services.  When paramedics arrived they gave her 

nitroglycerin sublingually which relieved her pain after receiving 2 doses.  Her

pain was present for approximately 25 minutes in total.  Her pain has not 

recurred since the administration of nitroglycerin by the paramedics.  She 

denies prior similar episodes and has not identified any additional aggravating 

or ameliorating factors for her chest pain.  In the emergency room she was found

to have an EKG negative for evidence of acute myocardial ischemia or infarction.

 She was found to have an elevated serum troponin at 0.102 which is in the 

indeterminate range.  She was subsequently admitted to the hospital for further 

evaluation and treatment.





Past Medical History


Cardiac Medical History: Reports: Hypertension


   Denies: Coronary Artery Disease, Myocardial Infarction, Hyperlipidema


Pulmonary Medical History: Reports: Bronchitis, Pneumonia - 1998


   Denies: Asthma, Chronic Obstructive Pulmonary Disease (COPD), Intubation


EENT Medical History: 


   Denies: Cataracts, Ears - Hearing aids


Neurological Medical History: Reports: Migraine


   Denies: Hemorrhagic CVA, Ischemic CVA, Multiple Sclerosis, Seizures


Endocrine Medical History: Reports: Obesity


   Denies: Diabetes Mellitus Type 1, Diabetes Mellitus Type 2, Hyperthyroidism, 

Hypothyroidism


Renal/ Medical History: 


   Denies: Chronic Kidney Disease, Nephrolithiasis


Malignancy Medical History: Reports: None


GI Medical History: 


   Denies: Cirrhosis, Crohn's Disease, Hepatitis, Ulcerative Colitis


Musculoskeltal Medical History: 


   Denies: Arthritis, Gout


Skin Medical History: 


   Denies: Eczema, Psoriasis


Psychiatric Medical History: Reports: Bipolar Disorder, Depression, General 

Anxiety Disorder, Tobacco Dependency


   Denies: Alcohol Dependency, Substance Abuse


Traumatic Medical History: Reports: None


Hematology: 


   Denies: Anemia, Bleeding Tendencies


Infectious Medical History: Reports: None





Past Surgical History


Past Surgical History: Reports: Tubal Ligation





Social History


Information Source: Patient


Lives with: Spouse/Significant other


Smoking Status: Current Every Day Smoker


Frequency of Alcohol Use: None


Hx Recreational Drug Use: No


Drugs: None


Hx Prescription Drug Abuse: No





- Advance Directive


Resuscitation Status: Full Code


Surrogate healthcare decision maker:: 


Kaitlynn Cuevas





Family History


Family History: CAD, COPD, DM, Hypertension, Malignancy


Parental Family History Reviewed: Yes


Children Family History Reviewed: No


Sibling(s) Family History Reviewed.: Yes





Medication/Allergy


Home Medications: 








Buspirone HCl [Buspar 10 mg Tablet] 10 mg PO DAILY 01/06/19 


Citalopram Hydrobromide [Celexa 20 mg Tablet] 20 mg PO DAILY 01/06/19 


Clonidine HCl [Clonidine HCl ER] 0.5 mg PO DAILY 01/06/19 


Lisinopril/Hydrochlorothiazide [Lisinopril-Hctz 20-25 mg Tab] 1 tbs PO DAILY 

01/06/19 


Zolpidem Tartrate [Ambien] 10 mg PO QHS 01/06/19 


Nitrofurantoin Macrocrystal [Nitrofurantoin] 1,000 mg PO BID #20 capsule 

01/07/19 


Acyclovir [Acyclovir 400 mg Tablet] 400 mg PO 5XD #50 tablet 03/14/19 


Prednisone [Deltasone 20 mg Tablet] 20 mg PO DAILY #5 tablet 03/14/19 








Allergies/Adverse Reactions: 


                                        





gabapentin Allergy (Unknown, Verified 06/13/19 04:38)


   Anaphylaxis











Review of Systems


Constitutional: ABSENT: chills, fever(s)


Eyes: ABSENT: visual disturbances, other - Eye pain


Ears: ABSENT: hearing changes, other - Ear pain


Nose, Mouth, and Throat: ABSENT: mouth pain, sore throat


Cardiovascular: PRESENT: as per HPI, chest pain.  ABSENT: dyspnea on exertion, 

edema, orthropnea, palpitations


Respiratory: PRESENT: as per HPI, dyspnea.  ABSENT: cough


Gastrointestinal: PRESENT: as per HPI, nausea, vomiting.  ABSENT: abdominal 

pain, constipation, diarrhea


Genitourinary: ABSENT: dysuria, hematuria


Musculoskeletal: PRESENT: as per HPI, back pain.  ABSENT: joint swelling, muscle

weakness


Integumentary: PRESENT: as per HPI, diaphoresis.  ABSENT: pruritus, rash


Neurological: PRESENT: dizziness - Had been feeling dizzy and lightheaded most 

of the day prior to the chest pain event that brought her to the hospital.  

ABSENT: confusion, convulsions, focal weakness, memory loss, syncope


Psychiatric: ABSENT: anxiety, depression


Endocrine: ABSENT: cold intolerance, heat intolerance


Hematologic/Lymphatic: ABSENT: easy bleeding, easy bruising





Physical Exam


Vital Signs: 


                                        











Temp Pulse Resp BP Pulse Ox


 


 97.8 F             


 


 06/12/19 23:36            








                                 Intake & Output











 06/11/19 06/12/19 06/13/19





 23:59 23:59 23:59


 


Weight  89.811 kg 











General appearance: PRESENT: no acute distress, cooperative, obese


Head exam: PRESENT: atraumatic, normocephalic


Eye exam: ABSENT: conjunctival injection, scleral icterus


Ear exam: PRESENT: normal external ear exam.  ABSENT: bleeding, drainage


Mouth exam: PRESENT: dry mucosa, neck supple


Neck exam: ABSENT: thyromegaly, tracheal deviation


Respiratory exam: PRESENT: clear to auscultation sun, symmetrical, unlabored


Cardiovascular exam: PRESENT: RRR.  ABSENT: clicks, gallop, rubs


Pulses: PRESENT: normal radial pulses, normal dorsalis pedis pul


Vascular exam: PRESENT: normal capillary refill.  ABSENT: pallor


GI/Abdominal exam: PRESENT: normal bowel sounds, soft


Rectal exam: PRESENT: deferred


Extremities exam: ABSENT: joint swelling, pedal edema


Musculoskeletal exam: PRESENT: full ROM, normal inspection


Neurological exam: PRESENT: alert, oriented to person, oriented to place, 

oriented to time, oriented to situation, CN II-XII grossly intact.  ABSENT: 

motor sensory deficit


Psychiatric exam: PRESENT: appropriate affect, normal mood


Skin exam: PRESENT: dry, intact, warm.  ABSENT: jaundice, rash, urticaria





Results


Laboratory Results: 


                                        





                                 06/12/19 23:10 





                                 06/12/19 23:10 





                                        











  06/12/19 06/12/19





  23:10 23:10


 


WBC  7.6 


 


RBC  4.95 


 


Hgb  15.3 


 


Hct  45.0 


 


MCV  91 


 


MCH  30.9 


 


MCHC  34.0 


 


RDW  14.8 H 


 


Plt Count  309 


 


Seg Neutrophils %  56.4 


 


Lymphocytes %  33.9 


 


Monocytes %  6.6 


 


Eosinophils %  2.3 


 


Basophils %  0.8 


 


Absolute Neutrophils  4.3 


 


Absolute Lymphocytes  2.6 


 


Absolute Monocytes  0.5 


 


Absolute Eosinophils  0.2 


 


Absolute Basophils  0.1 


 


Sodium   140.2


 


Potassium   4.0


 


Chloride   107


 


Carbon Dioxide   26


 


Anion Gap   7


 


BUN   14


 


Creatinine   0.86


 


Est GFR ( Amer)   > 60


 


Est GFR (Non-Af Amer)   > 60


 


Glucose   146 H


 


Calcium   9.6


 


Total Bilirubin   0.5


 


AST   20


 


ALT   15


 


Alkaline Phosphatase   80


 


Total Protein   7.0


 


Albumin   4.0








                                        











  06/12/19





  23:10


 


Troponin I  0.102











EKG Comments: 


EKG shows a normal sinus rhythm with a heart rate of 63 bpm.


Impressions: 


                                        





Chest X-Ray  06/12/19 23:33


IMPRESSION:


 


No acute cardiopulmonary process


 


 


copyright 2011 [employer]- All Rights Reserved


 











Status: Image reviewed by me - Lungs are clear, cardiac size is normal, bony 

chest structures are normal in appearance.  There is no acute cardiac or 

pulmonary disease noted on the single view chest film.





Assessment and Plan





- Diagnosis


(1) Chest pain


Qualifiers: 


   Chest pain type: unspecified   Qualified Code(s): R07.9 - Chest pain, 

unspecified   


Is this a current diagnosis for this admission?: Yes   


Plan: 


Patient will have serial cardiac enzymes performed as well as serial EKGs.  A 

cardiac stress test will be obtained if her cardiac work-up is negative.  

Patient will use morphine sulfate 2 to 4 mg IV every 2 hours as needed chest 

pain on a sliding scale basis.  Additionally she will have available sublingual 

nitroglycerin for use in treating chest pain.








(2) Elevated troponin I level


Is this a current diagnosis for this admission?: Yes   


Plan: 


Patient's serum troponin will be evaluated with serial troponin I levels as well

as serial CK and CK-MB levels.  Serial EKGs will also be obtained.  Patient will

undergo a cardiac stress test in the morning if her serum troponin/cardiac 

enzyme levels do not indicate/demonstrate a non-STEMI.








(3) Hypertension


Qualifiers: 


   Hypertension type: essential hypertension   Qualified Code(s): I10 - 

Essential (primary) hypertension   


Is this a current diagnosis for this admission?: Yes   


Plan: 


Patient's hypertension will be treated with her usual antihypertensive regimen 

and her blood pressure be monitored closely throughout her hospital course.  

Changes in the patient's regiment may be made depending on the results of other 

studies.








(4) Obesity (BMI 30-39.9)


Is this a current diagnosis for this admission?: Yes   


Plan: 


A dietary consult may be obtained to advise the patient regarding dietary and 

lifestyle choices that will help to improve her overall health and reduce her 

overall health risks.








(5) Tobacco use disorder, severe, dependence


Is this a current diagnosis for this admission?: Yes   


Plan: 


Smoking cessation is advised and brief counseling is provided.  A nicotine 

replacement patch is available for the patient's use if desired.








- Time


Time Spent with patient: 25-34 minutes


Smoking Cessation Education: 3 to 10 minutes


Medications reviewed and adjusted accordingly: Yes


Anticipated discharge: Home





- Inpatient Certification


Based on my medical assessment, after consideration of the patient's 

comorbidities, presenting symptoms, or acuity I expect that the services needed 

warrant INPATIENT care.: No


I certify that my determination is in accordance with my understanding of 

Medicare's requirements for reasonable and necessary INPATIENT services [42 CFR 

412.3e].: No


Medical Necessity: Significant Comorbidiites Make Outpatient Treatment Too 

Risky, Need Close Monitoring Due to Risk of Patient Decompensation, Need For 

Continuous Telemetry Monitoring, Risk of Complication if Not Cared For in 

Hospital

## 2019-06-13 NOTE — RADIOLOGY REPORT (SQ)
EXAM DESCRIPTION: 



XR CHEST 1 VIEW



COMPLETED DATE/TME:  06/12/2019 23:33



CLINICAL HISTORY: 



49 years, Female, chest pain



COMPARISON:

7/25/2014 chest



NUMBER OF VIEWS:

1



TECHNIQUE:

Portable chest



LIMITATIONS:

None.



FINDINGS:



Heart size normal. Osteopenia. Lungs clear. No pneumothorax



IMPRESSION:



No acute cardiopulmonary process

 



copyright 2011 [employer]- All Rights Reserved

## 2019-06-13 NOTE — ADVANCED CARE
- Diagnosis


(1) Chest pain


Diagnosis Current: Yes   





(2) Elevated troponin I level


Diagnosis Current: Yes   





(3) Hypertension


Diagnosis Current: Yes   





(4) Obesity (BMI 30-39.9)


Diagnosis Current: Yes   





(5) Tobacco use disorder, severe, dependence


Diagnosis Current: Yes   


Attendance: 


The patient and myself


Resuscitation Status: Full Code


Discussion: 


The patient I discussed her desires for care should she suffer a cardiac or 

respiratory arrest during her hospitalization.  She is adamant that she would 

like to have full resuscitation efforts made in such conditions.  We further 

discussed the possibility of creating a living will and I have recommended that 

she consider getting the appropriate forms and filling them out to make sure 

that her loved one or medical surrogate is certain of her wishes for what should

be done in a variety of situations that may occur when she would be unable to s

peak for herself as to what medical treatment she would like to have given or 

withheld according to the circumstances of the situation.


Care Planning Goals: 


#1 the patient will be continued his full code.  #2 patient will consider a 

living will.


Document(s) Completed: 


Patient's EMR is labeled as full code.  Patient will consider adopting a living 

will.


Time Spent: 27 minutes

## 2019-06-13 NOTE — PDOC PROGRESS REPORT
Subjective


Progress Note for:: 06/13/19


Subjective:: 





49 y.o. F with a PMH HTN, bronchitis, Bipolar Disorder, Depression, General 

Anxiety Disorder, Tobacco Dependency (1.5-2PPD smoker for 30 yrs).  She is 

admitted to [facility] for chest pain. The patient reports she woke up with severe back

pain radiating to her chest.  Her pain resolved after receiving 325 mg aspirin 

and SL Nitro tabs x 2. EKG shows NSR. The patient was found to have elevated 

Troponin 0.1. It has since trended up 0.1-->0.4-->0.5. The patient continues to 

deny chest pain. Repeat EKG this morning shows NSR< no infarction or ischemia.





The patient is scheduled to receive a catheterization today at [facility] by Dr. Santoyo.

Confirmed with Cath Lab that patient will undergo procedure this afternoon. 

Afterwards, she will be recovered in the ICU overnight. 





The patient was seen this afternoon on rounds. She is resting comfortably on 

room air in bed. The patient denies chest pain at this time.  Denies shortness 

of breath, dyspnea on exertion or palpitations.  On exam, LCTA. S1S2. No 

evidence of peripheral edema.


 


Reason For Visit: 


CHEST PAIN








Physical Exam


Vital Signs: 


                                        











Temp Pulse Resp BP Pulse Ox


 


 98.6 F   71   18   126/73 H  99 


 


 06/13/19 08:00  06/13/19 08:00  06/13/19 08:00  06/13/19 08:00  06/13/19 08:00








                                 Intake & Output











 06/12/19 06/13/19 06/14/19





 06:59 06:59 06:59


 


Weight  88.2 kg 











General appearance: PRESENT: no acute distress, well-developed, well-nourished


Head exam: PRESENT: atraumatic, normocephalic


Eye exam: PRESENT: conjunctiva pink, EOMI, PERRLA.  ABSENT: scleral icterus


Ear exam: PRESENT: normal external ear exam


Mouth exam: PRESENT: moist, tongue midline


Teeth exam: PRESENT: poor dentation


Neck exam: ABSENT: carotid bruit, JVD, lymphadenopathy, thyromegaly


Respiratory exam: PRESENT: clear to auscultation sun.  ABSENT: rales, rhonchi, 

wheezes


Cardiovascular exam: PRESENT: RRR.  ABSENT: diastolic murmur, rubs, systolic 

murmur


Pulses: PRESENT: normal radial pulses, normal dorsalis pedis pul


Vascular exam: PRESENT: normal capillary refill


GI/Abdominal exam: PRESENT: normal bowel sounds, soft.  ABSENT: distended, 

guarding, mass, organolmegaly, rebound, tenderness


Rectal exam: PRESENT: deferred


Extremities exam: PRESENT: full ROM.  ABSENT: calf tenderness, clubbing, pedal 

edema


Neurological exam: PRESENT: alert, awake, oriented to person, oriented to place,

oriented to time, oriented to situation


Psychiatric exam: PRESENT: appropriate affect, normal mood


Skin exam: PRESENT: dry, intact, warm.  ABSENT: cyanosis, rash





Results


Laboratory Results: 


                                        





                                 06/12/19 23:10 





                                 06/12/19 23:10 





                                        











  06/12/19 06/12/19 06/13/19





  23:10 23:10 02:50


 


WBC  7.6  


 


RBC  4.95  


 


Hgb  15.3  


 


Hct  45.0  


 


MCV  91  


 


MCH  30.9  


 


MCHC  34.0  


 


RDW  14.8 H  


 


Plt Count  309  


 


Seg Neutrophils %  56.4  


 


Lymphocytes %  33.9  


 


Monocytes %  6.6  


 


Eosinophils %  2.3  


 


Basophils %  0.8  


 


Absolute Neutrophils  4.3  


 


Absolute Lymphocytes  2.6  


 


Absolute Monocytes  0.5  


 


Absolute Eosinophils  0.2  


 


Absolute Basophils  0.1  


 


Sodium   140.2 


 


Potassium   4.0 


 


Chloride   107 


 


Carbon Dioxide   26 


 


Anion Gap   7 


 


BUN   14 


 


Creatinine   0.86 


 


Est GFR ( Amer)   > 60 


 


Est GFR (Non-Af Amer)   > 60 


 


Glucose   146 H 


 


Calcium   9.6 


 


Total Bilirubin   0.5 


 


AST   20 


 


ALT   15 


 


Alkaline Phosphatase   80 


 


Total Protein   7.0 


 


Albumin   4.0 


 


Triglycerides   


 


Cholesterol   


 


LDL Cholesterol Direct   


 


VLDL Cholesterol   


 


HDL Cholesterol   


 


TSH    2.00


 


Free T4    1.09


 


Free T3 pg/mL    4.56


 


Urine Color   


 


Urine Appearance   


 


Urine pH   


 


Ur Specific Gravity   


 


Urine Protein   


 


Urine Glucose (UA)   


 


Urine Ketones   


 


Urine Blood   


 


Urine Nitrite   


 


Ur Leukocyte Esterase   


 


Urine WBC (Auto)   














  06/13/19 06/13/19





  02:50 02:57


 


WBC  


 


RBC  


 


Hgb  


 


Hct  


 


MCV  


 


MCH  


 


MCHC  


 


RDW  


 


Plt Count  


 


Seg Neutrophils %  


 


Lymphocytes %  


 


Monocytes %  


 


Eosinophils %  


 


Basophils %  


 


Absolute Neutrophils  


 


Absolute Lymphocytes  


 


Absolute Monocytes  


 


Absolute Eosinophils  


 


Absolute Basophils  


 


Sodium  


 


Potassium  


 


Chloride  


 


Carbon Dioxide  


 


Anion Gap  


 


BUN  


 


Creatinine  


 


Est GFR ( Amer)  


 


Est GFR (Non-Af Amer)  


 


Glucose  


 


Calcium  


 


Total Bilirubin  


 


AST  


 


ALT  


 


Alkaline Phosphatase  


 


Total Protein  


 


Albumin  


 


Triglycerides  140 


 


Cholesterol  240.86 H 


 


LDL Cholesterol Direct  152 H 


 


VLDL Cholesterol  28.0 


 


HDL Cholesterol  62 


 


TSH  


 


Free T4  


 


Free T3 pg/mL  


 


Urine Color   YELLOW


 


Urine Appearance   CLOUDY


 


Urine pH   6.0


 


Ur Specific Gravity   1.027


 


Urine Protein   NEGATIVE


 


Urine Glucose (UA)   NEGATIVE


 


Urine Ketones   TRACE H


 


Urine Blood   NEGATIVE


 


Urine Nitrite   NEGATIVE


 


Ur Leukocyte Esterase   LARGE H


 


Urine WBC (Auto)   147








                                        











  06/12/19 06/13/19 06/13/19





  23:10 02:50 02:50


 


Creatine Kinase   60 


 


CK-MB (CK-2)    1.92


 


Troponin I  0.102   0.443














  06/13/19 06/13/19





  07:26 07:26


 


Creatine Kinase  56 


 


CK-MB (CK-2)   2.32


 


Troponin I   0.540











Impressions: 


                                        





Chest X-Ray  06/12/19 23:33


IMPRESSION:


 


No acute cardiopulmonary process


 


 


copyright 2011 [employer]- All Rights Reserved


 











Status: Imported from PACS





Assessment and Plan





- Diagnosis


(1) Chest pain


Qualifiers: 


   Chest pain type: unspecified   Qualified Code(s): R07.9 - Chest pain, 

unspecified   


Is this a current diagnosis for this admission?: Yes   


Plan: 


Resolved


(+) NSTEMI - EKG normal, rising troponins


Patient is chest pain free


Received 325mg ASA and SL Nitro tab x 2


Total chest pain time: 25-30 minutes


EKG shows NSR, no infarction or ischemia


CXR WNL, no cardiopulmonary pathology





Patient denies family history of cardiac disease - possibly mother with CAD but 

unsure








Dr. Santoyo, interventional cardiologist, was consulted


Plan for cardiac catheterization this afternoon














(2) Elevated troponin I level


Is this a current diagnosis for this admission?: Yes   


Plan: 


Continues to rise 0.102-->0.44-->0.54


Patient denies chest pain


No evidence of ST elevation on EKG


Dr. Santoyo, interventional cardiologist, was consulted


Plan for cardiac catheterization this afternoon


Patient will be recovered in the ICU following the procedure








(3) Hypertension


Qualifiers: 


   Hypertension type: essential hypertension   Qualified Code(s): I10 - 

Essential (primary) hypertension   


Is this a current diagnosis for this admission?: Yes   


Plan: 


PMH HTN


Blood pressure well controlled since admission


Currently not taking any antihypertensives


Patient admits she has not seen a physician/PCP "in a while"








(4) Obesity (BMI 30-39.9)


Is this a current diagnosis for this admission?: Yes   


Plan: 


Dietary weight management








(5) Tobacco use disorder, severe, dependence


Is this a current diagnosis for this admission?: Yes   


Plan: 


Smokes 1.5-2 PPD


Patient endorses 30-year smoking history


We will offer nicotine patch








- Time


Time Spent with patient: 15-24 minutes


Medications reviewed and adjusted accordingly: Yes


Anticipated discharge: Home


Within: within 24 hours, within 48 hours





- Inpatient Certification


Based on my medical assessment, after consideration of the patient's c

omorbidities, presenting symptoms, or acuity I expect that the services needed 

warrant INPATIENT care.: Yes


I certify that my determination is in accordance with my understanding of 

Medicare's requirements for reasonable and necessary INPATIENT services [42 CFR 

412.3e].: Yes


Medical Necessity: Need For Continuous Telemetry Monitoring, Need for Surgery - 

Cardiac catheterization, Risk of Complication if Not Cared For in Hospital

## 2019-06-14 LAB
ADD MANUAL DIFF: NO
ALBUMIN SERPL-MCNC: 3.5 G/DL (ref 3.5–5)
ALP SERPL-CCNC: 75 U/L (ref 38–126)
ALT SERPL-CCNC: 21 U/L (ref 9–52)
ANION GAP SERPL CALC-SCNC: 7 MMOL/L (ref 5–19)
AST SERPL-CCNC: 16 U/L (ref 14–36)
BASOPHILS # BLD AUTO: 0.1 10^3/UL (ref 0–0.2)
BASOPHILS NFR BLD AUTO: 0.9 % (ref 0–2)
BILIRUB DIRECT SERPL-MCNC: 0.2 MG/DL (ref 0–0.4)
BILIRUB SERPL-MCNC: 0.6 MG/DL (ref 0.2–1.3)
BUN SERPL-MCNC: 16 MG/DL (ref 7–20)
CALCIUM: 9.5 MG/DL (ref 8.4–10.2)
CHLORIDE SERPL-SCNC: 112 MMOL/L (ref 98–107)
CHOLEST SERPL-MCNC: 238.72 MG/DL (ref 0–200)
CO2 SERPL-SCNC: 22 MMOL/L (ref 22–30)
EOSINOPHIL # BLD AUTO: 0.1 10^3/UL (ref 0–0.6)
EOSINOPHIL NFR BLD AUTO: 1.6 % (ref 0–6)
ERYTHROCYTE [DISTWIDTH] IN BLOOD BY AUTOMATED COUNT: 14.7 % (ref 11.5–14)
GLUCOSE SERPL-MCNC: 93 MG/DL (ref 75–110)
HCT VFR BLD CALC: 42 % (ref 36–47)
HGB BLD-MCNC: 14.5 G/DL (ref 12–15.5)
LDLC SERPL DIRECT ASSAY-MCNC: 144 MG/DL (ref ?–100)
LYMPHOCYTES # BLD AUTO: 1.9 10^3/UL (ref 0.5–4.7)
LYMPHOCYTES NFR BLD AUTO: 26.9 % (ref 13–45)
MCH RBC QN AUTO: 31.2 PG (ref 27–33.4)
MCHC RBC AUTO-ENTMCNC: 34.5 G/DL (ref 32–36)
MCV RBC AUTO: 90 FL (ref 80–97)
MONOCYTES # BLD AUTO: 0.5 10^3/UL (ref 0.1–1.4)
MONOCYTES NFR BLD AUTO: 7.8 % (ref 3–13)
NEUTROPHILS # BLD AUTO: 4.3 10^3/UL (ref 1.7–8.2)
NEUTS SEG NFR BLD AUTO: 62.8 % (ref 42–78)
PLATELET # BLD: 233 10^3/UL (ref 150–450)
POTASSIUM SERPL-SCNC: 3.9 MMOL/L (ref 3.6–5)
PROT SERPL-MCNC: 6.2 G/DL (ref 6.3–8.2)
RBC # BLD AUTO: 4.64 10^6/UL (ref 3.72–5.28)
SODIUM SERPL-SCNC: 140.8 MMOL/L (ref 137–145)
TOTAL CELLS COUNTED % (AUTO): 100 %
TRIGL SERPL-MCNC: 116 MG/DL (ref ?–150)
VLDLC SERPL CALC-MCNC: 23 MG/DL (ref 10–31)
WBC # BLD AUTO: 6.9 10^3/UL (ref 4–10.5)

## 2019-06-14 RX ADMIN — Medication SCH: at 05:02

## 2019-06-14 RX ADMIN — TICAGRELOR SCH MG: 90 TABLET ORAL at 09:55

## 2019-06-14 RX ADMIN — FAMOTIDINE SCH MG: 20 TABLET, FILM COATED ORAL at 21:17

## 2019-06-14 RX ADMIN — BUSPIRONE HYDROCHLORIDE SCH MG: 10 TABLET ORAL at 09:55

## 2019-06-14 RX ADMIN — ASPIRIN SCH MG: 81 TABLET, COATED ORAL at 09:56

## 2019-06-14 RX ADMIN — CITALOPRAM HYDROBROMIDE SCH MG: 20 TABLET ORAL at 09:55

## 2019-06-14 RX ADMIN — Medication SCH: at 13:42

## 2019-06-14 RX ADMIN — TICAGRELOR SCH MG: 90 TABLET ORAL at 21:17

## 2019-06-14 RX ADMIN — Medication SCH: at 21:20

## 2019-06-14 RX ADMIN — NICOTINE SCH EACH: 21 PATCH, EXTENDED RELEASE TOPICAL at 09:56

## 2019-06-14 RX ADMIN — DOCUSATE SODIUM SCH MG: 100 CAPSULE, LIQUID FILLED ORAL at 09:56

## 2019-06-14 RX ADMIN — DOCUSATE SODIUM SCH MG: 100 CAPSULE, LIQUID FILLED ORAL at 18:17

## 2019-06-14 RX ADMIN — ATORVASTATIN CALCIUM SCH MG: 40 TABLET, FILM COATED ORAL at 21:17

## 2019-06-14 RX ADMIN — FAMOTIDINE SCH MG: 20 TABLET, FILM COATED ORAL at 09:57

## 2019-06-14 NOTE — PDOC PROGRESS REPORT
Subjective


Progress Note for:: 06/14/19


Subjective:: 





49 y.o. F with a PMH HTN, bronchitis, Bipolar Disorder, Depression, General 

Anxiety Disorder, Tobacco Dependency (1.5-2PPD smoker for 30 yrs).  She is 

admitted to [facility] for an NSTEMI. The patient reports she woke up with severe back 

pain radiating to her chest. The patient was found to have elevated Troponin 0.1

that trended up 0.1-->0.4-->0.5. The patient was evaluated by interventional 

cardiology and deemed to be a good candidate for catheterization.





The patient was found to have 85% occlusion of her RCA and received a singular 

stent by Dr. Santoyo. Confirmed with Cath Lab that patient will undergo procedure 

this afternoon. Afterwards, she will be recovered in the ICU overnight. 





The patient was seen this afternoon on rounds. She is resting comfortably on r

oom air in bed. The patient denies chest pain at this time.  Denies shortness of

breath, dyspnea on exertion or palpitations.  On exam, LCTA. S1S2. No evidence 

of peripheral edema.


 


Reason For Visit: 


CHEST PAIN








Physical Exam


Vital Signs: 


                                        











Temp Pulse Resp BP Pulse Ox


 


 98.8 F   84   18   152/81 H  99 


 


 06/14/19 08:00  06/14/19 08:00  06/14/19 08:00  06/14/19 08:00  06/14/19 08:00








                                 Intake & Output











 06/13/19 06/14/19 06/15/19





 06:59 06:59 06:59


 


Intake Total  2615 


 


Output Total  350 


 


Balance  2265 


 


Weight 88.2 kg 90.9 kg 











General appearance: PRESENT: no acute distress, well-developed, well-nourished


Head exam: PRESENT: atraumatic, normocephalic


Eye exam: PRESENT: conjunctiva pink, EOMI, PERRLA.  ABSENT: scleral icterus


Ear exam: PRESENT: normal external ear exam


Mouth exam: PRESENT: moist, tongue midline


Neck exam: ABSENT: carotid bruit, JVD, lymphadenopathy, thyromegaly


Respiratory exam: PRESENT: clear to auscultation sun.  ABSENT: rales, rhonchi, 

wheezes


Cardiovascular exam: PRESENT: RRR.  ABSENT: diastolic murmur, rubs, systolic 

murmur


Pulses: PRESENT: normal dorsalis pedis pul


Vascular exam: PRESENT: normal capillary refill


GI/Abdominal exam: PRESENT: normal bowel sounds, soft.  ABSENT: distended, 

guarding, mass, organolmegaly, rebound, tenderness


Rectal exam: PRESENT: deferred


Extremities exam: PRESENT: full ROM.  ABSENT: calf tenderness, clubbing, pedal 

edema


Neurological exam: PRESENT: alert, awake, oriented to person, oriented to place,

oriented to time, oriented to situation


Psychiatric exam: PRESENT: appropriate affect, normal mood


Skin exam: PRESENT: dry, intact, warm.  ABSENT: cyanosis, rash





Results


Laboratory Results: 


                                        





                                 06/14/19 06:18 





                                 06/14/19 06:18 





                                        











  06/14/19 06/14/19





  06:18 06:18


 


WBC   6.9


 


RBC   4.64


 


Hgb   14.5


 


Hct   42.0


 


MCV   90


 


MCH   31.2


 


MCHC   34.5


 


RDW   14.7 H


 


Plt Count   233


 


Seg Neutrophils %   62.8


 


Lymphocytes %   26.9


 


Monocytes %   7.8


 


Eosinophils %   1.6


 


Basophils %   0.9


 


Absolute Neutrophils   4.3


 


Absolute Lymphocytes   1.9


 


Absolute Monocytes   0.5


 


Absolute Eosinophils   0.1


 


Absolute Basophils   0.1


 


Sodium  140.8 


 


Potassium  3.9 


 


Chloride  112 H 


 


Carbon Dioxide  22 


 


Anion Gap  7 


 


BUN  16 


 


Creatinine  0.80 


 


Est GFR ( Amer)  > 60 


 


Est GFR (Non-Af Amer)  > 60 


 


Glucose  93 


 


Calcium  9.5 


 


Magnesium  1.9 


 


Total Bilirubin  0.6 


 


AST  16 


 


ALT  21 


 


Alkaline Phosphatase  75 


 


Total Protein  6.2 L 


 


Albumin  3.5 


 


Triglycerides  116 


 


Cholesterol  238.72 H 


 


LDL Cholesterol Direct  144 H 


 


VLDL Cholesterol  23.0 


 


HDL Cholesterol  62 








                                        











  06/12/19 06/13/19 06/13/19





  23:10 02:50 02:50


 


Creatine Kinase   60 


 


CK-MB (CK-2)    1.92


 


Troponin I  0.102   0.443














  06/13/19 06/13/19 06/13/19





  07:26 07:26 16:24


 


Creatine Kinase  56   43


 


CK-MB (CK-2)   2.32 


 


Troponin I   0.540 














  06/13/19 06/14/19





  16:24 06:18


 


Creatine Kinase  


 


CK-MB (CK-2)  1.44 


 


Troponin I  0.275  0.428











Impressions: 


                                        





Chest X-Ray  06/12/19 23:33


IMPRESSION:


 


No acute cardiopulmonary process


 


 


copyright 2011 [employer]- All Rights Reserved


 











Status: Imported from PACS





Assessment and Plan





- Diagnosis


(1) Chest pain


Qualifiers: 


   Chest pain type: unspecified   Qualified Code(s): R07.9 - Chest pain, 

unspecified   


Is this a current diagnosis for this admission?: Yes   


Plan: 


Resolved


(+) NSTEMI - EKG normal, rising troponins


EKG shows NSR, no infarction or ischemia


Patient is chest pain free


CXR WNL, no cardiopulmonary pathology





Patient denies family history of cardiac disease - possibly mother with CAD but 

unsure








Dr. Santoyo, interventional cardiologist, was consulted


Cardiac catheterization yesterday


85% occlusion of RCA - stent x 1


Started on Brillinta and atorvastatin and aspirin - will need to continue at 

discharge


If she remains chest pain free, will d/c home tomorrow














(2) Elevated troponin I level


Is this a current diagnosis for this admission?: Yes   


Plan: 





Patient denies chest pain


No evidence of ST elevation on EKG


Dr. Santoyo, interventional cardiologist, was consulted


Cardiac catheterization done yesterday


remaining plan above








(3) Hypertension


Qualifiers: 


   Hypertension type: essential hypertension   Qualified Code(s): I10 - 

Essential (primary) hypertension   


Is this a current diagnosis for this admission?: Yes   


Plan: 


[facility] HTN


Blood pressure well controlled since admission


Currently not taking any antihypertensives


Patient admits she has not seen a physician/PCP "in a while"








(4) Obesity (BMI 30-39.9)


Is this a current diagnosis for this admission?: Yes   


Plan: 


Dietary weight management








(5) Tobacco use disorder, severe, dependence


Is this a current diagnosis for this admission?: Yes   


Plan: 


Smokes 1.5-2 PPD


Patient endorses 30-year smoking history


We will offer nicotine patch








- Time


Time Spent with patient: 15-24 minutes


Medications reviewed and adjusted accordingly: Yes


Anticipated discharge: Home


Within: within 24 hours





- Inpatient Certification


Based on my medical assessment, after consideration of the patient's 

comorbidities, presenting symptoms, or acuity I expect that the services needed 

warrant INPATIENT care.: Yes


I certify that my determination is in accordance with my understanding of 

Medicare's requirements for reasonable and necessary INPATIENT services [42 CFR 

412.3e].: Yes


Medical Necessity: Need For Continuous Telemetry Monitoring, Risk of 

Complication if Not Cared For in Hospital

## 2019-06-15 VITALS — SYSTOLIC BLOOD PRESSURE: 147 MMHG | DIASTOLIC BLOOD PRESSURE: 94 MMHG

## 2019-06-15 LAB
ALBUMIN SERPL-MCNC: 3.6 G/DL (ref 3.5–5)
ALP SERPL-CCNC: 73 U/L (ref 38–126)
ALT SERPL-CCNC: 13 U/L (ref 9–52)
ANION GAP SERPL CALC-SCNC: 6 MMOL/L (ref 5–19)
AST SERPL-CCNC: 17 U/L (ref 14–36)
BILIRUB DIRECT SERPL-MCNC: 0.2 MG/DL (ref 0–0.4)
BILIRUB SERPL-MCNC: 0.5 MG/DL (ref 0.2–1.3)
BUN SERPL-MCNC: 13 MG/DL (ref 7–20)
CALCIUM: 9.4 MG/DL (ref 8.4–10.2)
CHLORIDE SERPL-SCNC: 110 MMOL/L (ref 98–107)
CK MB SERPL-MCNC: 0.5 NG/ML (ref ?–4.55)
CK SERPL-CCNC: 39 U/L (ref 30–135)
CO2 SERPL-SCNC: 25 MMOL/L (ref 22–30)
ERYTHROCYTE [DISTWIDTH] IN BLOOD BY AUTOMATED COUNT: 14.7 % (ref 11.5–14)
GLUCOSE SERPL-MCNC: 111 MG/DL (ref 75–110)
HCT VFR BLD CALC: 41.8 % (ref 36–47)
HGB BLD-MCNC: 14.2 G/DL (ref 12–15.5)
MCH RBC QN AUTO: 30.8 PG (ref 27–33.4)
MCHC RBC AUTO-ENTMCNC: 34 G/DL (ref 32–36)
MCV RBC AUTO: 91 FL (ref 80–97)
PHOSPHATE SERPL-MCNC: 4 MG/DL (ref 2.5–4.5)
PLATELET # BLD: 229 10^3/UL (ref 150–450)
POTASSIUM SERPL-SCNC: 4.5 MMOL/L (ref 3.6–5)
PROT SERPL-MCNC: 6.2 G/DL (ref 6.3–8.2)
RBC # BLD AUTO: 4.61 10^6/UL (ref 3.72–5.28)
SODIUM SERPL-SCNC: 140.5 MMOL/L (ref 137–145)
TROPONIN I SERPL-MCNC: 0.16 NG/ML
WBC # BLD AUTO: 6.7 10^3/UL (ref 4–10.5)

## 2019-06-15 RX ADMIN — DOCUSATE SODIUM SCH: 100 CAPSULE, LIQUID FILLED ORAL at 09:51

## 2019-06-15 RX ADMIN — ASPIRIN SCH MG: 81 TABLET, COATED ORAL at 09:51

## 2019-06-15 RX ADMIN — NICOTINE SCH EACH: 21 PATCH, EXTENDED RELEASE TOPICAL at 09:51

## 2019-06-15 RX ADMIN — Medication SCH: at 15:02

## 2019-06-15 RX ADMIN — BUSPIRONE HYDROCHLORIDE SCH MG: 10 TABLET ORAL at 09:50

## 2019-06-15 RX ADMIN — FAMOTIDINE SCH MG: 20 TABLET, FILM COATED ORAL at 09:51

## 2019-06-15 RX ADMIN — CITALOPRAM HYDROBROMIDE SCH MG: 20 TABLET ORAL at 09:50

## 2019-06-15 RX ADMIN — Medication SCH: at 05:27

## 2019-06-15 RX ADMIN — TICAGRELOR SCH MG: 90 TABLET ORAL at 09:50

## 2019-06-20 ENCOUNTER — HOSPITAL ENCOUNTER (EMERGENCY)
Dept: HOSPITAL 62 - ER | Age: 49
Discharge: HOME | End: 2019-06-20
Payer: SELF-PAY

## 2019-06-20 VITALS — DIASTOLIC BLOOD PRESSURE: 76 MMHG | SYSTOLIC BLOOD PRESSURE: 123 MMHG

## 2019-06-20 DIAGNOSIS — R06.02: Primary | ICD-10-CM

## 2019-06-20 DIAGNOSIS — Z98.51: ICD-10-CM

## 2019-06-20 DIAGNOSIS — R61: ICD-10-CM

## 2019-06-20 DIAGNOSIS — I10: ICD-10-CM

## 2019-06-20 DIAGNOSIS — F17.200: ICD-10-CM

## 2019-06-20 LAB
ADD MANUAL DIFF: NO
ALBUMIN SERPL-MCNC: 4.3 G/DL (ref 3.5–5)
ALP SERPL-CCNC: 84 U/L (ref 38–126)
ALT SERPL-CCNC: 25 U/L (ref 9–52)
ANION GAP SERPL CALC-SCNC: 7 MMOL/L (ref 5–19)
APTT BLD: 37.9 SEC (ref 23.5–35.8)
AST SERPL-CCNC: 18 U/L (ref 14–36)
BASOPHILS # BLD AUTO: 0.1 10^3/UL (ref 0–0.2)
BASOPHILS NFR BLD AUTO: 0.8 % (ref 0–2)
BILIRUB DIRECT SERPL-MCNC: 0.2 MG/DL (ref 0–0.4)
BILIRUB SERPL-MCNC: 0.6 MG/DL (ref 0.2–1.3)
BUN SERPL-MCNC: 12 MG/DL (ref 7–20)
CALCIUM: 9.7 MG/DL (ref 8.4–10.2)
CHLORIDE SERPL-SCNC: 107 MMOL/L (ref 98–107)
CK SERPL-CCNC: 48 U/L (ref 30–135)
CO2 SERPL-SCNC: 25 MMOL/L (ref 22–30)
EOSINOPHIL # BLD AUTO: 0.1 10^3/UL (ref 0–0.6)
EOSINOPHIL NFR BLD AUTO: 1.8 % (ref 0–6)
ERYTHROCYTE [DISTWIDTH] IN BLOOD BY AUTOMATED COUNT: 14.3 % (ref 11.5–14)
GLUCOSE SERPL-MCNC: 98 MG/DL (ref 75–110)
HCT VFR BLD CALC: 45 % (ref 36–47)
HGB BLD-MCNC: 15.6 G/DL (ref 12–15.5)
INR PPP: 0.87
LYMPHOCYTES # BLD AUTO: 2.1 10^3/UL (ref 0.5–4.7)
LYMPHOCYTES NFR BLD AUTO: 27.9 % (ref 13–45)
MCH RBC QN AUTO: 31.3 PG (ref 27–33.4)
MCHC RBC AUTO-ENTMCNC: 34.6 G/DL (ref 32–36)
MCV RBC AUTO: 90 FL (ref 80–97)
MONOCYTES # BLD AUTO: 0.5 10^3/UL (ref 0.1–1.4)
MONOCYTES NFR BLD AUTO: 7.2 % (ref 3–13)
NEUTROPHILS # BLD AUTO: 4.6 10^3/UL (ref 1.7–8.2)
NEUTS SEG NFR BLD AUTO: 62.3 % (ref 42–78)
PLATELET # BLD: 293 10^3/UL (ref 150–450)
POTASSIUM SERPL-SCNC: 4.6 MMOL/L (ref 3.6–5)
PROT SERPL-MCNC: 7.2 G/DL (ref 6.3–8.2)
PROTHROMBIN TIME: 12.3 SEC (ref 11.4–15.4)
RBC # BLD AUTO: 4.98 10^6/UL (ref 3.72–5.28)
SODIUM SERPL-SCNC: 138.8 MMOL/L (ref 137–145)
TOTAL CELLS COUNTED % (AUTO): 100 %
WBC # BLD AUTO: 7.4 10^3/UL (ref 4–10.5)

## 2019-06-20 PROCEDURE — 71046 X-RAY EXAM CHEST 2 VIEWS: CPT

## 2019-06-20 PROCEDURE — 93005 ELECTROCARDIOGRAM TRACING: CPT

## 2019-06-20 PROCEDURE — 82550 ASSAY OF CK (CPK): CPT

## 2019-06-20 PROCEDURE — 36415 COLL VENOUS BLD VENIPUNCTURE: CPT

## 2019-06-20 PROCEDURE — 84484 ASSAY OF TROPONIN QUANT: CPT

## 2019-06-20 PROCEDURE — 80053 COMPREHEN METABOLIC PANEL: CPT

## 2019-06-20 PROCEDURE — 85730 THROMBOPLASTIN TIME PARTIAL: CPT

## 2019-06-20 PROCEDURE — 99285 EMERGENCY DEPT VISIT HI MDM: CPT

## 2019-06-20 PROCEDURE — 85610 PROTHROMBIN TIME: CPT

## 2019-06-20 PROCEDURE — 93010 ELECTROCARDIOGRAM REPORT: CPT

## 2019-06-20 PROCEDURE — 85025 COMPLETE CBC W/AUTO DIFF WBC: CPT

## 2019-06-20 NOTE — ER DOCUMENT REPORT
ED General





- General


Chief Complaint: Shortness Of Breath


Stated Complaint: SHORTNESS OF BREATH


Time Seen by Provider: 06/20/19 10:51


Mode of Arrival: Ambulatory


Notes: 





41-year-old lady presents with diaphoresis.  She was here and discharged a 

couple days ago after having an an STEMI and had a cath with a stent to the RCA.

 She says that during hospitalization she developed intermittent diaphoresis and

was told it was not a big deal.  It is persisted over the last several days 

comes for minutes at a time even when she sitting in front of an air conditioner

and is now resolved.  The last time it occurred was about an hour ago.  Does not

come with chest pain.  She is had some intermittent shortness of breath which 

did not line up with a diaphoresis.  She is taking all her medicines but does 

continue to smoke.


TRAVEL OUTSIDE OF THE [place] IN LAST 30 DAYS: No





- Related Data


Allergies/Adverse Reactions: 


                                        





gabapentin Allergy (Unknown, Verified 06/20/19 10:06)


   Anaphylaxis











Past Medical History





- General


Information source: Patient





- Social History


Smoking Status: Current Every Day Smoker


Chew tobacco use (# tins/day): No


Frequency of alcohol use: None


Drug Abuse: None


Family History: CAD, COPD, DM, Hypertension, Malignancy


Patient has suicidal ideation: No


Patient has homicidal ideation: No





- Past Medical History


Cardiac Medical History: Reports: Hx Hypertension


   Denies: Hx Congestive Heart Failure, Hx Coronary Artery Disease, Hx Heart 

Attack, Hx Hypercholesterolemia


Pulmonary Medical History: Reports: Hx Bronchitis, Hx Pneumonia - 1998


   Denies: Hx Asthma, Hx COPD, Hx Intubation, Hx Tuberculosis


Neurological Medical History: Reports: Hx Migraine.  Denies: Hx Seizures


Endocrine Medical History: Denies: Hx Diabetes Mellitus Type 1, Hx Diabetes 

Mellitus Type 2, Hx Hyperthyroidism, Hx Hypothyroidism


Renal/ Medical History: Denies: Hx End Stage Renal Disease, Hx Kidney Stones, 

Hx Peritoneal Dialysis


GI Medical History: Reports: Hx Gastroesophageal Reflux Disease.  Denies: Hx 

Cirrhosis, Hx Crohn's Disease, Hx Hepatitis, Hx Ulcer, Hx Ulcerative Colitis


Musculoskeletal Medical History: Denies Hx Arthritis, Denies Hx Gout, Denies Hx 

Multiple Sclerosis


Skin Medical History: Denies Hx Eczema, Denies Hx Psoriasis


Psychiatric Medical History: Reports: Hx Bipolar Disorder, Hx Depression


   Denies: Hx Schizophrenia


Infectious Medical History: Denies: Hx Hepatitis


Past Surgical History: Reports: Hx Tubal Ligation





- Immunizations


Hx Diphtheria, Pertussis, Tetanus Vaccination: No





Review of Systems





- Review of Systems


Notes: 





REVIEW OF SYSTEMS





GEN: Sweating


ENT: Denies sore throat, nasal discharge, ear pain


EYES: Denies blurry vision, eye pain, discharge


CV: Denies chest pain, palpitations, edema


RESP: As of breath


GI: Denies abdominal pain, nausea, vomiting, diarrhea


MSK: Denies joint pain/swelling, edema, 


SKIN: Denies rash, skin lesions


LYMPH: Denies swollen glands/lymph nodes


NEURO: Denies headache, focal weakness or numbness, dizziness


PSYCH: Denies depression, suicidal or homicidal ideation








PHYSICAL EXAMINATION





General: No acute distress, well-nourished


Head: Atraumatic, normocephalic


ENT: Mouth normal, oropharynx moist, no exudates or tonsillar enlargement


Eyes: Conjunctiva normal, pupils equal, lids normal


Neck: No JVD, supple, no guarding


CVS: Normal rate, regular rhythm, no murmurs


Resp: No resp distress, equal and normal breath sounds bilaterally


GI: Nondistended, soft, no tenderness to palpation, no rebound or guarding


Ext: No deformities, no edema, normal range of motion in upper and lower ext


Back: No CVA or midline TTP


Skin: No rash, warm


Lymphatic: No lymphadeopathy noted


Neuro: Awake, alert.  Face symmetric.  GCS 15.





Physical Exam





- Vital signs


Vitals: 


                                        











Temp Pulse Resp BP Pulse Ox


 


 98.0 F   73   16   143/100 H  94 


 


 06/20/19 10:10  06/20/19 10:10  06/20/19 10:10  06/20/19 10:10  06/20/19 10:10














Course





- Re-evaluation


Re-evalutation: 





06/20/19 12:46


Presents intermittent sweating.  Recently discharged after elevated troponin and

stent to the RCA.  The symptoms are different than her anginal index symptoms.  

Today she is not sweaty on my exam.  She has no signs of hyperthyroidism on 

exam.  Her EKG and troponin are negative and after several days of sweating this

rules out an STEMI.  I think she is safe for discharge given her negative 

evaluation in the ED but may require further testing for things like thyroid 

abuse chromocytoma, etc.  We talked about smoking in the setting of MI.  I have 

discussed with the patient there likely diagnosis, aftercare plan, follow-up 

plans and my usual and customary return precautions.  They verbalized 

understanding of this.





- Vital Signs


Vital signs: 


                                        











Temp Pulse Resp BP Pulse Ox


 


 98.0 F   73   16   143/100 H  94 


 


 06/20/19 10:10  06/20/19 10:10  06/20/19 10:10  06/20/19 10:10  06/20/19 10:10














- Laboratory


Result Diagrams: 


                                 06/20/19 11:11





                                 06/20/19 11:11


Laboratory results interpreted by me: 


                                        











  06/20/19 06/20/19





  11:11 11:11


 


Hgb  15.6 H 


 


RDW  14.3 H 


 


APTT   37.9 H














- EKG Interpretation by Me


EKG shows normal: Sinus rhythm


Rate: Normal


Rhythm: NSR





Discharge





- Discharge


Clinical Impression: 


 Sweating





Condition: Good


Disposition: HOME, SELF-CARE


Instructions:  Angina Episode ([facility])


Additional Instructions: 


I do not think that your sweating represents angina however I have given you 

instructions for angina just in case.  All of your cardiac testing today is 

normal.  Please ask your primary care to check thyroid and perhaps other tests 

to determine the cause of the sweating.  If it gets worse to get more short of 

breath with any chest pain please return to the ER immediately.


Referrals: 


[facility] [Provider Group] - Follow up as needed

## 2019-06-20 NOTE — ER DOCUMENT REPORT
ED Medical Screen (RME)





- General


Chief Complaint: Shortness Of Breath


Stated Complaint: SHORTNESS OF BREATH


Time Seen by Provider: 06/20/19 10:51


Mode of Arrival: Ambulatory


Information source: Patient


Notes: 





Patient presents emergency department with reports that she recently had a MI 

and had a stent placement here at [facility].  She reports she has

had profuse sweating since that time.  She also reports mild shortness of 

breath.  Denies chest pain.  Denies all other symptoms such as fever vomiting 

diarrhea.  [facility]  

Patient reports that this profuse sweating will happen at random times.  But it 

seems like it is happening all the time.











I have greeted and performed a rapid initial assessment of this patient.  A 

comprehensive ED assessment and evaluation of the patient, analysis of test 

results and completion of the medical decision making process will be conducted 

by additional ED providers.


Dictation of this chart was performed using voice recognition software; 

therefore, there may be some unintended grammatical errors.


TRAVEL OUTSIDE OF THE [place] IN LAST 30 DAYS: No





- Related Data


Allergies/Adverse Reactions: 


                                        





gabapentin Allergy (Unknown, Verified 06/20/19 10:06)


   Anaphylaxis











Past Medical History





- Past Medical History


Cardiac Medical History: Reports: Hx Hypertension


   Denies: Hx Congestive Heart Failure, Hx Coronary Artery Disease, Hx Heart 

Attack, Hx Hypercholesterolemia


Pulmonary Medical History: Reports: Hx Bronchitis, Hx Pneumonia - 1998


   Denies: Hx Asthma, Hx COPD, Hx Intubation, Hx Tuberculosis


Neurological Medical History: Reports: Hx Migraine.  Denies: Hx Seizures


Endocrine Medical History: Denies: Hx Diabetes Mellitus Type 1, Hx Diabetes 

Mellitus Type 2, Hx Hyperthyroidism, Hx Hypothyroidism


Renal/ Medical History: Denies: Hx End Stage Renal Disease, Hx Kidney Stones, 

Hx Peritoneal Dialysis


GI Medical History: Reports: Hx Gastroesophageal Reflux Disease.  Denies: Hx 

Cirrhosis, Hx Crohn's Disease, Hx Hepatitis, Hx Ulcer, Hx Ulcerative Colitis


Musculoskeltal Medical History: Denies Hx Arthritis, Denies Hx Gout, Denies Hx 

Multiple Sclerosis


Skin Medical History: Denies Hx Eczema, Denies Hx Psoriasis


Psychiatric Medical History: Reports: Hx Bipolar Disorder, Hx Depression


   Denies: Hx Schizophrenia


Infectious Medical History: Denies: Hx Hepatitis


Past Surgical History: Reports: Hx Tubal Ligation





- Immunizations


Hx Diphtheria, Pertussis, Tetanus Vaccination: No





Physical Exam





- Vital signs


Vitals: 





                                        











Temp Pulse Resp BP Pulse Ox


 


 98.0 F   73   16   143/100 H  94 


 


 06/20/19 10:10  06/20/19 10:10  06/20/19 10:10  06/20/19 10:10  06/20/19 10:10














Course





- Vital Signs


Vital signs: 





                                        











Temp Pulse Resp BP Pulse Ox


 


 98.0 F   73   16   143/100 H  94 


 


 06/20/19 10:10  06/20/19 10:10  06/20/19 10:10  06/20/19 10:10  06/20/19 10:10

## 2019-06-20 NOTE — RADIOLOGY REPORT (SQ)
EXAM DESCRIPTION:  CHEST 2 VIEWS



COMPLETED DATE/TIME:  6/20/2019 11:37 am



REASON FOR STUDY:  sob



COMPARISON:  Chest films 6/12/2019, 7/25/2014



EXAM PARAMETERS:  NUMBER OF VIEWS: two views

TECHNIQUE: Digital Frontal and Lateral radiographic views of the chest acquired.

RADIATION DOSE: NA

LIMITATIONS: none



FINDINGS:  LUNGS AND PLEURA: No opacities, masses or pneumothorax. No pleural effusion.

MEDIASTINUM AND HILAR STRUCTURES: No masses or contour abnormalities.

HEART AND VASCULAR STRUCTURES: Heart normal size.  No evidence for failure.

BONES: No acute findings.

HARDWARE: None in the chest.

OTHER: No other significant finding.



IMPRESSION:  NO ACUTE RADIOGRAPHIC FINDING IN THE CHEST.



TECHNICAL DOCUMENTATION:  JOB ID:  [id number]

 2011 [employer]- All Rights Reserved



Reading location - IP/workstation name: [email or web address]

## 2019-06-20 NOTE — PDOC DISCHARGE SUMMARY
General





- Admit/Disc Date/PCP


Admission Date/Primary Care Provider: 


  06/13/19 02:14





  





Discharge Date: 06/15/19





- Discharge Diagnosis


(1) Chest pain


Is this a current diagnosis for this admission?: Yes   





(2) Elevated troponin I level


Is this a current diagnosis for this admission?: Yes   





(3) Hypertension


Is this a current diagnosis for this admission?: Yes   





(4) Obesity (BMI 30-39.9)


Is this a current diagnosis for this admission?: Yes   





(5) Tobacco use disorder, severe, dependence


Is this a current diagnosis for this admission?: Yes   





- Additional Information


Resuscitation Status: Full Code


Discharge Diet: Cardiac


Discharge Activity: Activity As Tolerated


Prescriptions: 


Aspirin [Ecotrin 81 mg EC Tablet] 81 mg PO DAILY #30 tabec


Atorvastatin Calcium [Lipitor 40 mg Tablet] 40 mg PO QHS #30 tablet


Clopidogrel Bisulfate [Plavix 75 mg Tablet] 75 mg PO DAILY #30 tablet


Nicotine [Nicoderm 21 mg/24 Hr Transderm Patch] 1 each TD DAILY #30 patch.td24


Nitroglycerin [Nitrostat 0.4 mg (1/150 Gr) Tabs 25/Bottle] 1 tab SL Q5MP PRN #1 

bottle


 PRN Reason: 


Home Medications: 








Buspirone HCl [Buspar 5 mg Tablet] 5 mg PO BID 06/13/19 


Citalopram Hydrobromide [Celexa 20 mg Tablet] 20 mg PO DAILY 06/13/19 


Clonazepam [Klonopin] 0.5 mg PO BID 06/13/19 


Ibuprofen [Motrin 800 mg Tablet] 800 mg PO TIDP PRN 06/13/19 


Olanzapine [Zyprexa 5 mg Tablet] 5 mg PO BID 06/13/19 


Aspirin [Ecotrin 81 mg EC Tablet] 81 mg PO DAILY #30 tabec 06/15/19 


Atorvastatin Calcium [Lipitor 40 mg Tablet] 40 mg PO QHS #30 tablet 06/15/19 


Buspirone HCl [Buspar 10 mg Tablet] 10 mg PO DAILY  tablet 06/15/19 


Citalopram Hydrobromide [Celexa 20 mg Tablet] 20 mg PO DAILY  tablet 06/15/19 


Clopidogrel Bisulfate [Plavix 75 mg Tablet] 75 mg PO DAILY #30 tablet 06/15/19 


Nicotine [Nicoderm 21 mg/24 Hr Transderm Patch] 1 each TD DAILY #30 patch.td24 

06/15/19 


Nitroglycerin [Nitrostat 0.4 mg (1/150 Gr) Tabs 25/Bottle] 1 tab SL Q5MP PRN #1 

bottle 06/15/19 











History of Present Illness


History of Present Illness: 


STEFANI PATINO is a 49 year old female who presented to the emergency room with 

acute chest pain.  She admits that at shortly before 10 PM she ate a bowl of 

cereal before going to bed and after lying down for a period of 1 or 2 minutes 

she began feeling a deep seated pressure-like pain in her mid back that rapidly 

moved forward through her chest as a severely intense pressure in the mid-

sternal anterior chest region, radiating to her left neck and shoulder and down 

her left arm.  Pain was accompanied by nausea, vomiting (x1), dyspnea and 

diaphoresis.  Shortly after the onset of pain she took 4 baby aspirin and called

for emergency medical services.  When paramedics arrived they gave her 

nitroglycerin sublingually which relieved her pain after receiving 2 doses.  Her

pain was present for approximately 25 minutes in total.  Her pain has not 

recurred since the administration of nitroglycerin by the paramedics.  She 

denies prior similar episodes and has not identified any additional aggravating 

or ameliorating factors for her chest pain.  In the emergency room she was found

to have an EKG negative for evidence of acute myocardial ischemia or infarction.

 She was found to have an elevated serum troponin at 0.102 which is in the 

indeterminate range.  She was subsequently admitted to the hospital for further 

evaluation and treatment.








Hospital Course


Hospital Course: 





49 y.o. F with a PMH HTN, bronchitis, Bipolar Disorder, Depression, General 

Anxiety Disorder, Tobacco Dependency (1.5-2PPD smoker for 30 yrs).  She is 

admitted to [facility] for an NSTEMI. The patient reports she woke up with severe back 

pain radiating to her chest. The patient was found to have elevated Troponin 0.1

that trended up 0.1-->0.4-->0.5. The patient was evaluated by interventional 

cardiology and deemed to be a good candidate for catheterization.





The patient was found to have 85% occlusion of her RCA and received a singular 

stent by Dr. Santoyo (interventional cardiology). The patient was monitored for 24

hours following her procedure. She did not have recurrence of chest pain. She 

was discharged home on guidelines directed aspirin, statin, and plavix. The 

patient was strongly urged to stop smoking, a great deal of time was spent 

counseling the patient on smoking cessation. She was offered a prescription for 

a nicotine patch. 





Physical Exam


Vital Signs: 


                                        











Temp Pulse Resp BP Pulse Ox


 


 98.2 F   82   16   147/94 H  98 


 


 06/15/19 15:00  06/15/19 15:00  06/15/19 15:00  06/15/19 15:00  06/15/19 15:00











General appearance: PRESENT: no acute distress, well-developed, well-nourished


Head exam: PRESENT: atraumatic, normocephalic


Eye exam: PRESENT: conjunctiva pink, EOMI, PERRLA.  ABSENT: scleral icterus


Ear exam: PRESENT: normal external ear exam


Mouth exam: PRESENT: moist, tongue midline


Neck exam: ABSENT: carotid bruit, JVD, lymphadenopathy, thyromegaly


Respiratory exam: PRESENT: clear to auscultation sun.  ABSENT: rales, rhonchi, 

wheezes


Cardiovascular exam: PRESENT: RRR.  ABSENT: diastolic murmur, rubs, systolic 

murmur


Pulses: PRESENT: normal dorsalis pedis pul


Vascular exam: PRESENT: normal capillary refill


GI/Abdominal exam: PRESENT: normal bowel sounds, soft.  ABSENT: distended, 

guarding, mass, organolmegaly, rebound, tenderness


Rectal exam: PRESENT: deferred


Extremities exam: PRESENT: full ROM.  ABSENT: calf tenderness, clubbing, pedal 

edema


Neurological exam: PRESENT: alert, awake, oriented to person, oriented to place,

oriented to time, oriented to situation, CN II-XII grossly intact.  ABSENT: 

motor sensory deficit


Psychiatric exam: PRESENT: appropriate affect, normal mood.  ABSENT: homicidal 

ideation, suicidal ideation


Skin exam: PRESENT: dry, intact, warm.  ABSENT: cyanosis, rash





Results


Laboratory Results: 


                                        





                                 06/15/19 09:17 





                                 06/15/19 09:17 





                                        











  06/12/19 06/13/19 06/13/19





  23:10 02:50 02:50


 


Creatine Kinase   60 


 


CK-MB (CK-2)    1.92


 


Troponin I  0.102   0.443














  06/13/19 06/13/19 06/13/19





  07:26 07:26 16:24


 


Creatine Kinase  56   43


 


CK-MB (CK-2)   2.32 


 


Troponin I   0.540 














  06/13/19 06/14/19 06/15/19





  16:24 06:18 09:17


 


Creatine Kinase    39


 


CK-MB (CK-2)  1.44  


 


Troponin I  0.275  0.428 














  06/15/19





  09:17


 


Creatine Kinase 


 


CK-MB (CK-2)  0.50


 


Troponin I  0.163











Impressions: 


                                        





Chest X-Ray  06/12/19 23:33


IMPRESSION:


 


No acute cardiopulmonary process


 


 


copyright 2011 [employer]- All Rights Reserved


 











Status: Imported from PACS





Qualifiers





- *


PATIENT BEING DISCHARGED WITH ANY OF THE FOLLOWING DIAGNOSIS: MI


MI Pt being discharged on Aspirin therapy?: Yes


MI Pt being discharged on Statins?: Yes


MI Pt discharged ACEI/ARBS?: Yes


Reason(s) for not prescribing ACEI/ARBS:: Not indicated - not hypertensive





Acute Heart Failure





- **


Is this a Heart Failure Patient?: No





Plan


Time Spent: Greater than 30 Minutes

## 2019-06-25 ENCOUNTER — HOSPITAL ENCOUNTER (OUTPATIENT)
Dept: HOSPITAL 62 - CCC | Age: 49
End: 2019-06-25
Payer: COMMERCIAL

## 2019-06-25 DIAGNOSIS — I21.11: Primary | ICD-10-CM

## 2019-06-25 PROCEDURE — 84443 ASSAY THYROID STIM HORMONE: CPT

## 2019-06-25 PROCEDURE — 36415 COLL VENOUS BLD VENIPUNCTURE: CPT

## 2019-06-25 PROCEDURE — 83036 HEMOGLOBIN GLYCOSYLATED A1C: CPT

## 2019-07-11 ENCOUNTER — HOSPITAL ENCOUNTER (OUTPATIENT)
Dept: HOSPITAL 62 - WI | Age: 49
End: 2019-07-11
Payer: COMMERCIAL

## 2019-07-11 DIAGNOSIS — Z12.31: Primary | ICD-10-CM

## 2019-07-11 PROCEDURE — 77067 SCR MAMMO BI INCL CAD: CPT

## 2019-07-11 NOTE — WOMENS IMAGING REPORT
EXAM DESCRIPTION:  PINK WARRIOR BILATERAL SCREEN



COMPLETED DATE/TIME:  7/11/2019 8:23 am



REASON FOR STUDY:  Z12.31 ROUTINE BILATERAL SCREENING Z12.31  ENCNTR SCREEN MAMMOGRAM FOR MALIGNANT N
EOPLASM OF NEERAJ



COMPARISON:  None.



EXAM PARAMETERS:  Standard craniocaudal and mediolateral oblique views of each breast recorded using 
digital acquisition.

Read with the assistance of CAD.

.[facility]  Version 9.2



LIMITATIONS:  None.



FINDINGS:  No suspicious masses, suspicious calcifications or architectural distortion. No areas of c
oncern.



IMPRESSION:  Negative MAMMOGRAM.  BIRADS 1



BREAST DENSITY:  b. There are scattered areas of fibroglandular density.



BIRAD:  ASSESSMENT:  1 NEGATIVE



RECOMMENDATION:  ROUTINE SCREENING



COMMENT:  The patient has been notified of the results by letter per MQSA requirements. Additional no
tification policies are in place for contacting patient with suspicious or incomplete findings.

Quality ID #225: The American College of Radiology recommends an annual screening mammogram for women
 aged 40 years or over. This facility utilizes a reminder system to ensure that all patients receive 
reminder letters, and/or direct phone calls for appointments. This includes reminders for routine scr
eening mammograms, diagnostic mammograms, or other Breast Imaging Interventions when appropriate.  Th
is patient will be placed in the appropriate reminder system.



TECHNICAL DOCUMENTATION:  FINDING NUMBER: (1)

ASSESSMENT: (1)

JOB ID:  [id number]

 2011 [employer]- All Rights Reserved



Reading location - IP/workstation name: [email or web address]

## 2019-07-26 ENCOUNTER — HOSPITAL ENCOUNTER (OUTPATIENT)
Dept: HOSPITAL 62 - RAD | Age: 49
End: 2019-07-26
Payer: COMMERCIAL

## 2019-07-26 DIAGNOSIS — M79.631: Primary | ICD-10-CM

## 2019-07-26 PROCEDURE — 76882 US LMTD JT/FCL EVL NVASC XTR: CPT

## 2019-07-26 NOTE — RADIOLOGY REPORT (SQ)
EXAM DESCRIPTION:  U/S EXTREMITY NONVASCULAR LTD



COMPLETED DATE/TIME:  7/26/2019 11:59 am



REASON FOR STUDY:  PAIN IN RIGHT FOREARM M79.631  PAIN IN RIGHT FOREARM



COMPARISON:  None.



TECHNIQUE:  Dynamic and static grayscale images acquired of the localized site of clinical concern an
d recorded on PACS. Additional selected color Doppler and spectral images recorded.

SITE OF CONCERN: Right forearm



LIMITATIONS:  None.



FINDINGS:  SKIN AND SUBCUTANEOUS TISSUES: No masses. No fluid collections. No edema. No foreign abida
s.

DEEP SOFT TISSUES/MUSCLES: No masses.  No fluid collections. No edema.

VASCULAR: No increased or decreased vascularity.  No occlusions.

OTHER: No other significant finding.



IMPRESSION:  No acute findings.  Specifically no evidence of pseudoaneurysm or hematoma to explain th
e patient's clinical symptoms.



TECHNICAL DOCUMENTATION:  JOB ID:  [id number]

 2011 [employer]- All Rights Reserved



Reading location - IP/workstation name: [email or web address]

## 2019-08-10 ENCOUNTER — HOSPITAL ENCOUNTER (EMERGENCY)
Dept: HOSPITAL 62 - ER | Age: 49
Discharge: HOME | End: 2019-08-10
Payer: COMMERCIAL

## 2019-08-10 VITALS — SYSTOLIC BLOOD PRESSURE: 115 MMHG | DIASTOLIC BLOOD PRESSURE: 92 MMHG

## 2019-08-10 DIAGNOSIS — S61.251A: Primary | ICD-10-CM

## 2019-08-10 DIAGNOSIS — Z23: ICD-10-CM
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
## 2019-08-10 DIAGNOSIS — Z98.51: ICD-10-CM

## 2019-08-10 DIAGNOSIS — F17.200: ICD-10-CM

## 2019-08-10 PROCEDURE — 99283 EMERGENCY DEPT VISIT LOW MDM: CPT

## 2019-08-10 PROCEDURE — 90471 IMMUNIZATION ADMIN: CPT

## 2019-08-10 PROCEDURE — 73140 X-RAY EXAM OF FINGER(S): CPT

## 2019-08-10 PROCEDURE — 90715 TDAP VACCINE 7 YRS/> IM: CPT

## 2019-08-10 NOTE — RADIOLOGY REPORT (SQ)
EXAM DESCRIPTION:  FINGER LEFT



COMPLETED DATE/TIME:  8/10/2019 1:48 pm



REASON FOR STUDY:  cat bite



COMPARISON:  None.



NUMBER OF VIEWS:  Three views.



TECHNIQUE:  AP, lateral, and oblique images acquired of the left second finger.



LIMITATIONS:  None.



FINDINGS:  MINERALIZATION: Normal.

BONES: No acute fracture or dislocation.  No worrisome bone lesions.

SOFT TISSUES: No soft tissue swelling.  No foreign body.

OTHER: No other significant finding.



IMPRESSION:  No fracture or dislocation of the left index finger.  No radiopaque foreign body.



TECHNICAL DOCUMENTATION:  JOB ID:  [id number]

 2011 [employer]- All Rights Reserved



Reading location - IP/workstation name: [email or web address]

## 2019-08-10 NOTE — ER DOCUMENT REPORT
ED General





- General


Chief Complaint: Cat Bite


Stated Complaint: POSSIBLE CAT BITE


Time Seen by Provider: 08/10/19 13:29


Primary Care Provider: 


[facility] [Primary Care Provider] - Follow up as needed


Notes: 





49-year-old female who presents to the emergency department with a cat bite to 

her left second finger.  She states that the bite happened yesterday.  She has 

had redness to her distal left second finger.  Patient denies any fever, body 

aches, chills or any other symptoms.  The cat is not immunized.  Patient states 

that the cat was foaming at the mouth, hissing, and drooling today.  Patient is 

not up-to-date on her tetanus vaccine.





TRAVEL OUTSIDE OF THE [place] IN LAST 30 DAYS: No





- Related Data


Allergies/Adverse Reactions: 


                                        





gabapentin Allergy (Unknown, Verified 08/10/19 13:21)


   Anaphylaxis











Past Medical History





- Social History


Smoking Status: Current Every Day Smoker


Frequency of alcohol use: None


Drug Abuse: None


Family History: CAD, COPD, DM, Hypertension, Malignancy


Patient has suicidal ideation: No


Patient has homicidal ideation: No





- Past Medical History


Cardiac Medical History: Reports: Hx Hypertension


   Denies: Hx Congestive Heart Failure, Hx Coronary Artery Disease, Hx Heart 

Attack, Hx Hypercholesterolemia


Pulmonary Medical History: Reports: Hx Bronchitis, Hx Pneumonia - 1998


   Denies: Hx Asthma, Hx COPD, Hx Intubation, Hx Tuberculosis


Neurological Medical History: Reports: Hx Migraine.  Denies: Hx Seizures


Endocrine Medical History: Denies: Hx Diabetes Mellitus Type 1, Hx Diabetes 

Mellitus Type 2, Hx Hyperthyroidism, Hx Hypothyroidism


Renal/ Medical History: Denies: Hx End Stage Renal Disease, Hx Kidney Stones, 

Hx Peritoneal Dialysis


GI Medical History: Reports: Hx Gastroesophageal Reflux Disease.  Denies: Hx 

Cirrhosis, Hx Crohn's Disease, Hx Hepatitis, Hx Ulcer, Hx Ulcerative Colitis


Musculoskeletal Medical History: Denies Hx Arthritis, Denies Hx Gout, Denies Hx 

Multiple Sclerosis


Skin Medical History: Denies Hx Eczema, Denies Hx Psoriasis


Psychiatric Medical History: Reports: Hx Bipolar Disorder, Hx Depression


   Denies: Hx Schizophrenia


Infectious Medical History: Denies: Hx Hepatitis


Past Surgical History: Reports: Hx Cardiac Catheterization, Hx Tubal Ligation





- Immunizations


Hx Diphtheria, Pertussis, Tetanus Vaccination: No





Review of Systems





- Review of Systems


Constitutional: See HPI


EENT: No symptoms reported


Cardiovascular: No symptoms reported


Respiratory: No symptoms reported


Gastrointestinal: No symptoms reported


Genitourinary: No symptoms reported


Female Genitourinary: No symptoms reported


Musculoskeletal: No symptoms reported


Skin: See HPI


Hematologic/Lymphatic: No symptoms reported


Neurological/Psychological: See HPI





Physical Exam





- Vital signs


Vitals: 





                                        











Temp Pulse Resp BP Pulse Ox


 


 97.3 F   83   16   140/102 H  99 


 


 08/10/19 13:27  08/10/19 13:27  08/10/19 13:27  08/10/19 13:27  08/10/19 13:27














- Notes


Notes: 





PHYSICAL EXAMINATION:





Reviewed vital signs and charting by RN





GENERAL: Alert, interacts well. No acute distress.


HEAD: Normocephalic, atraumatic.


EYES: Pupils equal and round. Extraocular movements intact.


ENT: Oral mucosa moist, tongue midline. 


NECK: Full range of motion. Trachea midline.


EXTREMITIES: Moves all 4 extremities spontaneously. No edema,  No cyanosis.


PSYCH: Normal affect, normal mood.


SKIN: Warm, dry, normal turgor.  Several healing scratches along the medial 

aspect of the right hand and a small puncture along the edge of the nail in the 

ring finger.








Course





- Re-evaluation


Re-evalutation: 





08/10/19 14:28


Overall well-appearing, cat is in patient's possession.  Patient received a dose

of Augmentin in the emergency department and her tetanus booster.  Plan is to 

instruct patient to take the cat into animal control for a 10-day period of 

observation prior to her receiving PEP.  At this time wound appears clean and 

there is no evidence of purulent discharge or signs of infection.  Patient is 

afebrile.  She is stable for discharge.





- Vital Signs


Vital signs: 





                                        











Temp Pulse Resp BP Pulse Ox


 


 97.3 F   83   16   140/102 H  99 


 


 08/10/19 13:27  08/10/19 13:27  08/10/19 13:27  08/10/19 13:27  08/10/19 13:27














Discharge





- Discharge


Clinical Impression: 


Cat bite of finger


Qualifiers:


 Encounter type: initial encounter Qualified Code(s): S61.259A - Open bite of 

unspecified finger without damage to nail, initial encounter; W55.01XA - Bitten 

by cat, initial encounter





Condition: Good


Disposition: HOME, SELF-CARE


Additional Instructions: 


You were seen in the emergency department this afternoon for a cat bite.  It is 

important that you take your In for a 10-day period of observation and for a 

 exam.  Typically, if the cat is alive at the 10-day skylar Most 

likely does not have rabies.  If the cat dies, then the head will get sent off 

for analysis  It is only after 10 days where the decision is to be made if you 

need post exposure prophylaxis.  At this time you do not.  Please take the 

antibiotics as directed until they are gone.  If he starts to develop fever, 

redness and warmth at the site of your scratches or bite, you get purulent 

discharge from the wound, red streaks moving up your arms, your joints get red 

and hot and you are unable to move them at all, please immediately return to the

emergency department for reevaluation.


Referrals: 


[facility] [Primary Care Provider] - Follow up as needed

## 2019-08-10 NOTE — ER DOCUMENT REPORT
ED Medical Screen (RME)





- General


Chief Complaint: Cat Bite


Stated Complaint: POSSIBLE CAT BITE


Time Seen by Provider: 08/10/19 13:29


Primary Care Provider: 


[facility] [Primary [facility] Provider] - Follow up as needed


Notes: 





Patient is a 49-year-old female who presents to the emergency department with a 

cat bite to her left second finger.  She states that the bite happened 

yesterday.  She has had redness to her distal left second finger.  Patient 

denies any fever, body aches, chills or any other symptoms.  The cat is not 

immunized.  Patient states that the cat was foaming at the mouth, hissing, and 

drooling today.  Patient is not up-to-date on her tetanus vaccine.





Exam: Cat scratch marks to patient's left hand.  Tenderness to distal left 

second finger.





I have greeted and performed a rapid initial assessment of this patient.  A 

comprehensive ED assessment and evaluation of the patient, analysis of test 

results and completion of medical decision making process will be conducted by 

an additional ED providers.


TRAVEL OUTSIDE OF THE [place] IN LAST 30 DAYS: No





- Related Data


Allergies/Adverse Reactions: 


                                        





gabapentin Allergy (Unknown, Verified 08/10/19 13:21)


   Anaphylaxis











Past Medical History





- Social History


Frequency of alcohol use: None


Drug Abuse: None





- Past Medical History


Cardiac Medical History: Reports: Hx Hypertension


   Denies: Hx Congestive Heart Failure, Hx Coronary Artery Disease, Hx Heart 

Attack, Hx Hypercholesterolemia


Pulmonary Medical History: Reports: Hx Bronchitis, Hx Pneumonia - 1998


   Denies: Hx Asthma, Hx COPD, Hx Intubation, Hx Tuberculosis


Neurological Medical History: Reports: Hx Migraine.  Denies: Hx Seizures


Endocrine Medical History: Denies: Hx Diabetes Mellitus Type 1, Hx Diabetes 

Mellitus Type 2, Hx Hyperthyroidism, Hx Hypothyroidism


Renal/ Medical History: Denies: Hx End Stage Renal Disease, Hx Kidney Stones, 

Hx Peritoneal Dialysis


GI Medical History: Reports: Hx Gastroesophageal Reflux Disease.  Denies: Hx 

Cirrhosis, Hx Crohn's Disease, Hx Hepatitis, Hx Ulcer, Hx Ulcerative Colitis


Musculoskeltal Medical History: Denies Hx Arthritis, Denies Hx Gout, Denies Hx 

Multiple Sclerosis


Skin Medical History: Denies Hx Eczema, Denies Hx Psoriasis


Psychiatric Medical History: Reports: Hx Bipolar Disorder, Hx Depression


   Denies: Hx Schizophrenia


Infectious Medical History: Denies: Hx Hepatitis


Past Surgical History: Reports: Hx Cardiac Catheterization, Hx Tubal Ligation





- Immunizations


Hx Diphtheria, Pertussis, Tetanus Vaccination: No





Physical Exam





- Vital signs


Vitals: 





                                        











Temp Pulse Resp BP Pulse Ox


 


 97.3 F   83   16   140/102 H  99 


 


 08/10/19 13:27  08/10/19 13:27  08/10/19 13:27  08/10/19 13:27  08/10/19 13:27














Course





- Vital Signs


Vital signs: 





                                        











Temp Pulse Resp BP Pulse Ox


 


 97.3 F   83   16   140/102 H  99 


 


 08/10/19 13:27  08/10/19 13:27  08/10/19 13:27  08/10/19 13:27  08/10/19 13:27














Doctor's Discharge





- Discharge


Referrals: 


[facility] [Primary Care Provider] - Follow up as needed

## 2020-01-20 ENCOUNTER — HOSPITAL ENCOUNTER (OUTPATIENT)
Dept: HOSPITAL 62 - OD | Age: 50
End: 2020-01-20
Payer: COMMERCIAL

## 2020-01-20 DIAGNOSIS — M25.00: Primary | ICD-10-CM

## 2020-01-20 PROCEDURE — 86038 ANTINUCLEAR ANTIBODIES: CPT

## 2020-01-20 PROCEDURE — 86225 DNA ANTIBODY NATIVE: CPT

## 2020-01-20 PROCEDURE — 73522 X-RAY EXAM HIPS BI 3-4 VIEWS: CPT

## 2020-01-20 PROCEDURE — 36415 COLL VENOUS BLD VENIPUNCTURE: CPT

## 2020-01-20 PROCEDURE — 85652 RBC SED RATE AUTOMATED: CPT

## 2020-01-20 NOTE — RADIOLOGY REPORT (SQ)
EXAM DESCRIPTION:  HIPS BILATERAL



COMPLETED DATE/TIME:  1/20/2020 10:57 am



REASON FOR STUDY:  HEMARTHROSIS, UNSPECIFIED JOINT M25.00  HEMARTHROSIS, UNSPECIFIED JOINT



COMPARISON:  None.



NUMBER OF VIEWS:  Two views



TECHNIQUE:  AP pelvis and additional frog-leg view of both hips.



LIMITATIONS:  None.



FINDINGS:  MINERALIZATION: Normal.

HIPS: No acute fracture or dislocation. No worrisome bone lesions.

PELVIS AND SACRUM:  No acute fracture or dislocation. No worrisome bone lesions.

PUBIS AND ISCHIUM: No acute fracture.

LOWER LUMBAR SPINE: No significant findings as visualized.

SOFT TISSUES: No findings.

OTHER: No other significant finding.



IMPRESSION:  NEGATIVE STUDY OF THE PELVIS AND HIPS.



TECHNICAL DOCUMENTATION:  JOB ID:  [id number]

 2011 [employer]- All Rights Reserved



Reading location - IP/workstation name: [email or web address]

## 2020-02-12 ENCOUNTER — HOSPITAL ENCOUNTER (OUTPATIENT)
Dept: HOSPITAL 62 - RAD | Age: 50
End: 2020-02-12
Payer: COMMERCIAL

## 2020-02-12 DIAGNOSIS — M25.552: Primary | ICD-10-CM

## 2020-02-12 DIAGNOSIS — M47.897: ICD-10-CM

## 2020-02-12 DIAGNOSIS — M54.5: ICD-10-CM

## 2020-02-12 PROCEDURE — 72148 MRI LUMBAR SPINE W/O DYE: CPT

## 2020-02-13 NOTE — RADIOLOGY REPORT (SQ)
EXAM DESCRIPTION:  MRILLJ WO



COMPLETED DATE/TIME:  2/12/2020 2:01 pm



REASON FOR STUDY:  L HIP PAIN



COMPARISON:  Recent radiographs.



TECHNIQUE:  Noncontrast multiplanar MR imaging.  Sequences include wide field of view pelvis and focu
sed hip of interest.  Fat sensitive, water sensitive, and cartilage sensitive sequences.

Specific hip of interest: Left



LIMITATIONS:  None.



FINDINGS:  MARROW SIGNAL: Normal, no evidence of replacement, occult fracture or suspicious bone lesi
on in the visualized lumbar spine, pelvis and proximal femurs.  Small cyst on the iliac side of the r
ight SI joint.

SPECIFIC HIP OF INTEREST:  No evidence of fracture or AVN.  No evidence of effusion.  No regional mas
s or significant muscle tear.  Minimal gluteal tendinosis, suspect mild greater trochanteric bursa sy
ndrome.  No subchondral cysts or erosions.  No secondary signs of labral tear.

OPPOSITE HIP: Normal.  No effusion or other significant finding on limited sequences.

REMAINDER OF THE OSSEOUS PELVIS: As above.  SI joints otherwise look normal.  Symphysis pubis intact.


INTRA- AND EXTRAPELVIC SOFT TISSUES: No intrapelvic mass or free fluid. Bladder normal.  No extrapelv
ic mass.  No inguinal hernia or adenopathy.



IMPRESSION:

1. No acute or suspicious left hip abnormality allowing for probable mild greater trochanteric syndro
me.

2. Small iliac sided cyst adjacent to the right SI joint suggests some SI joint arthropathy.



Reading location - IP[facility]workstation [email or web address]

## 2020-02-14 NOTE — RADIOLOGY REPORT (SQ)
EXAM DESCRIPTION:  MRI LUMBAR SPINE WITHOUT



COMPLETED DATE/TIME:  2/12/2020 2:01 pm



REASON FOR STUDY:  LOW BACK PAIN M25.552  PAIN IN LEFT HIP M54.5  LOW BACK PAIN



COMPARISON:  AP, lateral, oblique and L5-S1 spot views of the lumbosacral spine from 3/6/2018.



TECHNIQUE:  Sagittal and Axial imaging includes T1, T2, STIR and gradient echo sequences. Coronal T2/
HASTE imaging.



LIMITATIONS:  None.



FINDINGS:  VISUALIZED UPPER ABDOMEN: Limited evaluation.

SEGMENTATION: There are 5 lumbar-type vertebral bodies.  There is no transitional anatomy at the lumb
osacral junction.

ALIGNMENT: Grade 1 retrolisthesis of L5 relative to S1.

VERTEBRAE: The lumbar vertebral body heights are preserved.  There is no fracture.

BONE MARROW: Normal.

DISC SIGNAL: The L4-L5 intervertebral disc space is desiccated.  The L5-S1 intervertebral disc space 
is narrowed and desiccated.

POSTERIOR ELEMENTS:  Intact.

HARDWARE: None in the spine.

CORD AND CONUS: The conus medullaris terminates at the level of L1-L2 and it is normal in caliber and
 signal intensity.

SOFT TISSUES: No abnormality.

L1-L2: No spinal or foraminal stenosis.

L2-L3: No spinal or foraminal stenosis.

L3-L4: No spinal or foraminal stenosis.

L4-L5: Mild degeneration of the facet joints.  There is no spinal or foraminal stenosis.

L5-S1: Mild degeneration of the facet joints and central disc protrusion with a probable annular tear
 ; the disc protrusion encroaches on the ventral aspect of the thecal sac without mass effect upon th
e nerve roots.  There is no spinal stenosis.

LOWER THORACIC: No stenosis.

SACRUM: Intact.

OTHER: No other findings.



IMPRESSION:  Mild degenerative spondylosis and facet arthropathy at L4-L5 and L5-S1 with a central di
sc protrusion and probable annular tear at L5-S1 that encroaches on the ventral aspect of the thecal 
sac without mass effect upon the nerve roots.



TECHNICAL DOCUMENTATION:  JOB ID:  [id number]

 2011 [employer]- All Rights Reserved



Reading location - IP/workstation name: [email or web address]

## 2020-11-03 ENCOUNTER — HOSPITAL ENCOUNTER (OUTPATIENT)
Dept: HOSPITAL 62 - CCC | Age: 50
End: 2020-11-03
Payer: COMMERCIAL

## 2020-11-03 DIAGNOSIS — M79.602: ICD-10-CM

## 2020-11-03 DIAGNOSIS — M25.522: Primary | ICD-10-CM

## 2020-11-03 NOTE — RADIOLOGY REPORT (SQ)
EXAM DESCRIPTION:  FOREARM LEFT



COMPLETED DATE/TIME:  11/3/2020 11:11 am



REASON FOR STUDY:  PAIN IN LEFT ARM M25.522  PAIN IN LEFT ELBOW M79.602  PAIN IN LEFT ARM



COMPARISON:  None.



NUMBER OF VIEWS:  Two views.



TECHNIQUE:  Two radiographic images acquired of the left forearm, including elbow and wrist in at mya
st one projection.



LIMITATIONS:  None.



FINDINGS:  MINERALIZATION: Normal.

BONES: No acute fracture.  No worrisome bone lesions.

SOFT TISSUES: No obvious swelling or foreign body.

OTHER: No other significant finding.



IMPRESSION:  NEGATIVE STUDY OF THE LEFT FOREARM. NO RADIOGRAPHIC EVIDENCE OF ACUTE INJURY.



TECHNICAL DOCUMENTATION:  JOB ID:  [id number]

 2011 [employer]- All Rights Reserved



Reading location - IP/workstation name: [email or web address]

## 2020-11-03 NOTE — RADIOLOGY REPORT (SQ)
EXAM DESCRIPTION:  ELBOW LEFT AP/LAT



IMAGES COMPLETED DATE/TIME:  11/3/2020 11:11 am



REASON FOR STUDY:  PAIN IN LEFT ELBOW M25.522  PAIN IN LEFT ELBOW M79.602  PAIN IN LEFT ARM



COMPARISON:  None.



NUMBER OF VIEWS:  Two views.



TECHNIQUE:  AP and lateral radiographic images acquired of the left elbow.



LIMITATIONS:  None.



FINDINGS:  MINERALIZATION: Normal.

BONES: No acute fracture or dislocation.  No worrisome bone lesions.

JOINT: No effusion.

SOFT TISSUES: No soft tissue swelling.  No foreign body.

OTHER: No other significant finding.



IMPRESSION:  NEGATIVE STUDY OF THE LEFT ELBOW. NO RADIOGRAPHIC EVIDENCE OF ACUTE INJURY.



TECHNICAL DOCUMENTATION:  JOB ID:  [id number]

 2011 [employer]- All Rights Reserved



Reading location - IP/workstation name: [email or web address]